# Patient Record
Sex: FEMALE | Race: WHITE | HISPANIC OR LATINO | Employment: FULL TIME | URBAN - METROPOLITAN AREA
[De-identification: names, ages, dates, MRNs, and addresses within clinical notes are randomized per-mention and may not be internally consistent; named-entity substitution may affect disease eponyms.]

---

## 2018-10-17 ENCOUNTER — INITIAL PRENATAL (OUTPATIENT)
Dept: OBSTETRICS AND GYNECOLOGY | Facility: CLINIC | Age: 23
End: 2018-10-17

## 2018-10-17 VITALS
HEIGHT: 63 IN | DIASTOLIC BLOOD PRESSURE: 60 MMHG | WEIGHT: 181 LBS | SYSTOLIC BLOOD PRESSURE: 87 MMHG | BODY MASS INDEX: 32.07 KG/M2

## 2018-10-17 DIAGNOSIS — Z34.80 SUPERVISION OF OTHER NORMAL PREGNANCY, ANTEPARTUM: Primary | ICD-10-CM

## 2018-10-17 PROBLEM — Z87.51 HISTORY OF PRETERM DELIVERY: Status: ACTIVE | Noted: 2018-10-17

## 2018-10-17 PROBLEM — O09.90 HIGH-RISK PREGNANCY: Status: ACTIVE | Noted: 2018-10-17

## 2018-10-17 LAB
B-HCG UR QL: POSITIVE
INTERNAL NEGATIVE CONTROL: NEGATIVE
INTERNAL POSITIVE CONTROL: POSITIVE
Lab: ABNORMAL

## 2018-10-17 PROCEDURE — 81025 URINE PREGNANCY TEST: CPT | Performed by: OBSTETRICS & GYNECOLOGY

## 2018-10-17 PROCEDURE — 0501F PRENATAL FLOW SHEET: CPT | Performed by: OBSTETRICS & GYNECOLOGY

## 2018-10-17 RX ORDER — NITROFURANTOIN 25; 75 MG/1; MG/1
CAPSULE ORAL
COMMUNITY
Start: 2018-10-15 | End: 2018-11-14

## 2018-10-17 RX ORDER — PRENATAL VIT NO.126/IRON/FOLIC 28MG-0.8MG
TABLET ORAL
COMMUNITY
Start: 2018-10-15

## 2018-10-17 NOTE — PATIENT INSTRUCTIONS
Travel During Pregnancy:  • Always use seatbelts.  A lap belt should be worn below the abdomen (across the hips) and the shoulder belt should be worn across the center of your chest (between the breasts) away from your neck.  Do not put the shoulder belt under your arm or behind your back.  Pull any slack out of the belt.  • Air travel is safe in most uncomplicated pregnancies, but we do not recommend air travel past 36 weeks.  Airlines may also have restrictions, so check with your airline before flying.  For some international flights, the travel cut off may be as early as 28 weeks gestation, and some airlines may require letters from your physician.  • When going on long trips in car, plane, train, or bus, frequent ambulation is important to prevent blood clots in legs and/or lungs.  The following may help with prevention of blood clots in legs: Drink lots of fluid, wear loose-fitting clothing, walk and stretch at regular intervals.    • Avoid areas with Zika outbreaks.  For the latest information, you may visit: www.cdc.gov/travel/notices/.  Resources: , , Committee Opinion 455  Nutrition during pregnancy  • Average weight gain during pregnancy is based on your pre-pregnancy body mass index (BMI).  See below for recommended weight gain:  o Underweight (BMI <18.5), we recommend 28 to 40lb weight gain  o Normal weight (BMI 18.5 to 24.9), we recommend a 25 to 35lb weight gain  o Overweight (BMI 25-29.9), we recommend a 15 to 25lb weight gain  o Obese (BMI >30), we recommend keeping weight gain under 20 lbs.    • You should speak with your physician regarding your specific weight goals for this pregnancy.   • Foods to avoid include:   o Fish: avoid certain types of fish such as shark, swordfish, meagan mackerel, tilefish.  Limit white (albacore) tuna to 6 oz per week.  Choose fish and shellfish such as shrimp, salmon, catfish, Linn.  o Food-borne illness: Pregnant women are much more likely to get  Listeriosis than non-pregnant women.  To help prevent this, avoid eating unpasteurized milk and unpasteurized milk products, hot dogs/lunch meat/cold cuts unless they are heated until steaming right before serving, refrigerated meat spreads, refrigerated smoked seafood, raw or undercooked seafood/eggs/meat.   • Vitamin D helps development of the baby’s bones and teeth.  Good sources of Vitamin D include milk fortified with Vitamin D and fatty fish such as salmon.    • Folic acid, also known as folate, helps develop the baby’s brain and spine.  You should make sure your vitamin contains extra folic acid - at least 400mcg.    • Iron helps make red blood cells.  You need to make extra red blood cell sin pregnancy.  We recommend eating Iron-rich foods such as lean red meat, poultry, fish, dried beans and peas, iron-fortified cereals, and prune juice.  You may be recommended an iron supplement.  If so, it is absorbed more easily if taken with vitamin C-rich foods such as citrus fruits or tomatoes.    • It is important to eat a well balanced diet.  A good recourse for nutrition recommendations is: www.choosemyplate.gov.  • Limit caffeine intake to 200mg daily.  Some coffees/teas/sodas have very different levels of caffeine per serving, so check the nutrition labeling.   Resources: , Committee Opinion 548  Exercise during pregnancy  • If you are healthy and your pregnancy is normal, it is safe to continue or start most types of exercise.   Physical activity does not increase your risk of miscarriage, low birth weight or early delivery, but you should discuss specific limitations or any complications with your physician.    • Benefits of exercise during pregnancy include: reduced back pain, less constipation, promotes overall health and healthy weight gain which may decrease risks for certain pregnancy complications such as diabetes and/or preeclampsia.  • The CDC recommends 150 minutes of moderate intensity aerobic  exercise per week.  Moderate intensity means that you are moving enough to raise your heart rate and start sweating, but you can talk normally.  Brisk walking, swimming/water workouts, modified yoga/pilates, and use of elliptical machines and/or stationary bikes are examples of aerobic activity.    • Precautions:  o Stay hydrated.  Drink plenty of water before, during and after your workout  o Wear supportive clothing such as a sports bra  o Do not become overheated.  Do not exercise outside when it is very hot or humid  o Avoid lying flat on your back as much as possible  o AVOID contact sports, skydiving, sports that risk falling (such as skiing, surfing, off-road cycling, gymnastics, horseback riding), hot yoga/hot pilates, scuba diving  • Stop exercising if you experience: bleeding from the vagina, feeling dizzy/faint, shortness of breath before starting exercise, chest pain, headache, muscle weakness, calf pain or swelling, regular uterine contractions, fluid leaking from the vagina.    • Postpartum exercise: Continuing exercise after you deliver your baby will help boost your energy, strengthen muscles, promote better sleep, and relieve stress.  It also may be useful in preventing postpartum depression.  150 minutes of moderate-intensity aerobic activity is recommended.  Types of exercise and when you can start a regular exercise routine may be limited by the type of delivery you had.  Please discuss with your physician prior to resuming or starting exercise.    Resources: ,   Back pain during pregnancy  • Back pain is very common during pregnancy.  It may arise due to strain on your back muscles, weakness of the abdominal muscles, and pregnancy hormones.    • To prevent back pain:  o Wear shoes with good support. Flat shoes and high heels may not have good arch support.  o Consider a firm mattress  o Use good lifting practices.  Do not bend from the waist to pick things up.  Squat down and bend  "your knees, keeping a straight back.  o Sit in chairs with good back support or use a small pillow behind your lower back.    o Sleep on your side with one or two pillows between your legs  • To ease back pain:   o Exercise can help stretch strained muscles and strengthen weak muscles to promote good posture  • Contact your health care provider with severe pain, pain that persists for more than 2 weeks, fever, burning during urination, or vaginal bleeding.  Resources:     Nausea/vomiting in pregnancy:  To help with nausea and vomiting you may try the following over the counter products:  Tenisha chews, tenisha tea, tenisha gum  Mint tea, peppermint gum or candy  B6/Doxylamine: to be taken daily, not just when symptoms occur  Pyridoxine (also known as B6): 10 to 25 mg orally every six to eight hours; the maximum treatment dose suggested for pregnant women is 200 mg/day.  Doxylamine (available in some over-the-counter sleeping pills (eg, Unisom Sleep Tabs) and as an antihistamine chewable tablet (Aldex AN)). One-half of the 25 mg over-the-counter tablet or two chewable 5 mg tablets can be used off-label as an antiemetic  · The Association of Profess[employer] of Gyne[employer] and Obstetric[employer] has released a smart phone application that can help you monitor and manage your symptoms.  The Application is \"Managing NVP,\" and has a green icon that says \"APGO WELLMOM.\"    If these do not work, we may need to try prescription medications.    Please contact us if you are unable to tolerate liquids (even sips of water) for over six to eight hours, have weight loss of over 10% of your body weight, blood in vomit, fever (temp of 100.4 or higher), or other concerning symptoms arise.      "

## 2018-10-17 NOTE — PROGRESS NOTES
"Initial OB Visit    Chief Complaint   Patient presents with   • Initial Prenatal Visit      Lauren Stahl is being seen today for her first obstetrical visit.  She is a 23 y.o.    6w5d gestation.  She was previously seen at [facility].  We do not have those records immediately available.  She reports she went there for vaginal bleeding and was told that she had \"a divided uterus\" and \"a small baby.\"  She is uncertain if they found a heartbeat.  She was treated for urinary tract infection with Macrobid which she has already taken for 4 days.  Reports the bleeding has improved, she does have some midline cramping type abdominal pain.  FOB is here with her today.   #: 1, Date: 08/01/15, Sex: Female, Weight: 3118 g (6 lb 14 oz), GA: 39w0d, Delivery: Vaginal, Spontaneous Delivery, Apgar1: None, Apgar5: None, Living: Living, Birth Comments: None    #: 2, Date: 09/15/16, Sex: Male, Weight: None, GA: 23w0d, Delivery: Vaginal, Spontaneous Delivery, Apgar1: None, Apgar5: None, Living:  Demise, Birth Comments: None    #: 3, Date: None, Sex: None, Weight: None, GA: None, Delivery: None, Apgar1: None, Apgar5: None, Living: None, Birth Comments: None      Current obstetric complaints: spotting improved, mild cramping in midline, mild nausea - no vomiting.     Prior obstetric issues, potential pregnancy concerns: Has history of 23 week delivery with  death around 2-3 hours of life.  This occurred in [place].  She reports symptoms of  labor with rupture of membranes, going to the hospital and delivering a previable baby.  Her pregnancy in , was uncomplicated and she had a term vaginal delivery  Family history of genetic issues (includes FOB): none known  Prior infections concerning in pregnancy (Rash, fever since LMP): No  Varicella Hx -Immune by history   Prior testing for Cystic Fibrosis Carrier or Sickle Cell Trait- unsure  History of abnormal pap smears:  denies  History of STIs: " "denies  Prepregnancy BMI - Body mass index is 32.06 kg/m².    History reviewed. No pertinent past medical history.    History reviewed. No pertinent surgical history.      Current Outpatient Prescriptions:   •  nitrofurantoin, macrocrystal-monohydrate, (MACROBID) 100 MG capsule, , Disp: , Rfl:   •  Prenatal Vit-Fe Fumarate-FA (PRENATAL, CLASSIC, VITAMIN) 28-0.8 MG tablet tablet, , Disp: , Rfl:     Allergies   Allergen Reactions   • Amoxicillin Itching       Social History     Social History   • Marital status: Unknown     Spouse name: N/A   • Number of children: N/A   • Years of education: N/A     Occupational History   • Not on file.     Social History Main Topics   • Smoking status: Never Smoker   • Smokeless tobacco: Never Used   • Alcohol use No   • Drug use: No   • Sexual activity: Yes     Partners: Male     Birth control/ protection: None     Other Topics Concern   • Not on file     Social History Narrative   • No narrative on file       Family History   Problem Relation Age of Onset   • Breast cancer Neg Hx    • Ovarian cancer Neg Hx    • Uterine cancer Neg Hx    • Colon cancer Neg Hx    • Pulmonary embolism Neg Hx    • Deep vein thrombosis Neg Hx        Review of systems     Constitutional: negative for chills, fevers and negative for fatigue  Eyes: negative  Ears, nose, mouth, throat, and face: negative for hearing loss and nasal congestion  Respiratory: negative for asthma and wheezing  Cardiovascular: negative for chest pain and dyspnea  Gastrointestinal: negative for dyspepsia, dysphagia abdominal pain  Genitourinary:negative for urinary incontinence  Integument/breast: negative for breast lump  Hematologic/lymphatic: negative for bleeding  Musculoskeletal:negative for aches  Neurological: negative for numbness/tingling  Behavioral/Psych: negative for anhedonia  Allergic/Immunologic: negative for rash, allergy         Objective    BP (!) 87/60   Ht 160 cm (63\")   Wt 82.1 kg (181 lb)   LMP 08/31/2018 " (Exact Date)   BMI 32.06 kg/m²       General Appearance:    Alert, cooperative, in no acute distress, habitus obese   Head:    Normocephalic, without obvious abnormality, atraumatic   Eyes:            Lids and lashes normal, conjunctivae and sclerae normal, no   icterus, no pallor, corneas clear   Ears:    Ears appear intact with no abnormalities noted       Neck:   No adenopathy, supple, trachea midline, no thyromegaly   Back:     No kyphosis present, no scoliosis present,                       Lungs:     Clear to auscultation,respirations regular, even and                   unlabored    Heart:    Regular rhythm and normal rate, normal S1 and S2, no            murmur, no gallop, no rub, no click   Breast Exam:    No masses, No nipple discharge   Abdomen:     Normal bowel sounds, no masses, no organomegaly, soft        non-tender, non-distended, no guarding, no rebound                 tenderness   Genitalia:    Vulva - BUS-WNL, NEFG    Vagina - dark brown discharge    Cervix - No Lesions, closed     Uterus - Consistent with early gestation    Adnexa - No masses, NT    Pelvimetry - clinically adequate, gynecoid pelvis     Extremities:   Moves all extremities well, no edema, no cyanosis, no              redness   Pulses:   Pulses palpable and equal bilaterally   Skin:   No bleeding, bruising or rash   Lymph nodes:   No palpable adenopathy   Neurologic:   Sensation intact, A&O times 3      Assessment  Pregnancy at 6w5d  H/o  delivery  Vaginal spotting  ?Mullerian anomaly     Plan    Initial labs drawn, GC/CHL screen done  Patient is on Prenatal vitamins  Problem list reviewed and updated.  Will request pap smear from PCP office.  Will request prior records from previous delivery  Reviewed routine prenatal care with the office to include but not limited to:   Zika (travel restrictions/ok to use insect repellant), not to changing cat litter, food restrictions, avoidance of alcohol, tobacco and drugs and  saunas/hot tubs, anticipated weight gain/nutrition requirements.  Reviewed nature of practice and hospital.  Reviewed recommended follow up, importance of compliance with care. We reviewed testing in pregnancy including HIV testing and urine drug screen.    Reviewed aneuploidy screening and CF/SMA screening - undecided.  Counseled on limitations of ultrasound in pregnancy.  All questions answered.     History of 23 week delivery:  Discussed with patient the recurrence risk of  labor and  delivery.  She has previously been recommended for 17 OHP and is agreeable to that this pregnancy.  We discussed completing the paperwork at next visit with the anticipation of starting the injections around 16 weeks and continuing through 36 weeks.  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  At this time patient does not have an acute abdomen.  We will get a beta hCG and follow-up once ultrasound records are back.  I recommended that in the meantime if she were to have heavy bleeding or worsening pain, she should seek medical attention.      Return in about 1 week (around 10/24/2018) for repeat US and OB visit.      Michelle Mancilla MD

## 2018-10-18 ENCOUNTER — TELEPHONE (OUTPATIENT)
Dept: OBSTETRICS AND GYNECOLOGY | Facility: CLINIC | Age: 23
End: 2018-10-18

## 2018-10-18 LAB
AMPHETAMINES UR QL SCN: NEGATIVE NG/ML
BARBITURATES UR QL SCN: NEGATIVE NG/ML
BENZODIAZ UR QL: NEGATIVE NG/ML
BZE UR QL: NEGATIVE NG/ML
CANNABINOIDS UR QL SCN: NEGATIVE NG/ML
HCG INTACT+B SERPL-ACNC: NORMAL MIU/ML
METHADONE UR QL SCN: NEGATIVE NG/ML
OPIATES UR QL: NEGATIVE NG/ML
PCP UR QL: NEGATIVE NG/ML
PROPOXYPH UR QL SCN: NEGATIVE NG/ML

## 2018-10-18 NOTE — TELEPHONE ENCOUNTER
Please call patient and see if she will be able to come in for an ultrasound and appointment tomorrow.  The ultrasound from ED showed a gestation with a yolk sac with no fetal pole which is unusual at how high her pregnancy hormone level is.  Thanks!

## 2018-10-18 NOTE — TELEPHONE ENCOUNTER
10/18/18  Patient informed and will come tomorrow for u/s at 2:40pm. [place] address was provided and to arrive 15min before appt. .

## 2018-10-19 ENCOUNTER — PROCEDURE VISIT (OUTPATIENT)
Dept: OBSTETRICS AND GYNECOLOGY | Facility: CLINIC | Age: 23
End: 2018-10-19

## 2018-10-19 ENCOUNTER — ROUTINE PRENATAL (OUTPATIENT)
Dept: OBSTETRICS AND GYNECOLOGY | Facility: CLINIC | Age: 23
End: 2018-10-19

## 2018-10-19 VITALS — BODY MASS INDEX: 32.24 KG/M2 | WEIGHT: 182 LBS | DIASTOLIC BLOOD PRESSURE: 71 MMHG | SYSTOLIC BLOOD PRESSURE: 107 MMHG

## 2018-10-19 DIAGNOSIS — O20.9 BLEEDING IN EARLY PREGNANCY: Primary | ICD-10-CM

## 2018-10-19 DIAGNOSIS — Q51.20 OTHER DOUBLING OF UTERUS, UNSPECIFIED: ICD-10-CM

## 2018-10-19 DIAGNOSIS — O36.80X0 PREGNANCY WITH UNCERTAIN FETAL VIABILITY, SINGLE OR UNSPECIFIED FETUS: ICD-10-CM

## 2018-10-19 DIAGNOSIS — Q51.3 BICORNUATE UTERUS AFFECTING PREGNANCY IN FIRST TRIMESTER, ANTEPARTUM: ICD-10-CM

## 2018-10-19 DIAGNOSIS — O09.91 HIGH-RISK PREGNANCY IN FIRST TRIMESTER: Primary | ICD-10-CM

## 2018-10-19 DIAGNOSIS — O34.01 BICORNUATE UTERUS AFFECTING PREGNANCY IN FIRST TRIMESTER, ANTEPARTUM: ICD-10-CM

## 2018-10-19 LAB
ABO GROUP BLD: (no result)
BACTERIA UR CULT: NO GROWTH
BACTERIA UR CULT: NORMAL
BASOPHILS # BLD AUTO: 0 X10E3/UL (ref 0–0.2)
BASOPHILS NFR BLD AUTO: 0 %
BLD GP AB SCN SERPL QL: NEGATIVE
C TRACH RRNA VAG QL NAA+PROBE: NEGATIVE
EOSINOPHIL # BLD AUTO: 0.1 X10E3/UL (ref 0–0.4)
EOSINOPHIL NFR BLD AUTO: 1 %
ERYTHROCYTE [DISTWIDTH] IN BLOOD BY AUTOMATED COUNT: 13.7 % (ref 12.3–15.4)
HBV SURFACE AG SERPL QL IA: NEGATIVE
HCT VFR BLD AUTO: 42.2 % (ref 34–46.6)
HCV AB S/CO SERPL IA: <0.1 S/CO RATIO (ref 0–0.9)
HGB BLD-MCNC: 14.3 G/DL (ref 11.1–15.9)
HIV 1+2 AB+HIV1 P24 AG SERPL QL IA: NON REACTIVE
IMM GRANULOCYTES # BLD: 0 X10E3/UL (ref 0–0.1)
IMM GRANULOCYTES NFR BLD: 0 %
LYMPHOCYTES # BLD AUTO: 2.6 X10E3/UL (ref 0.7–3.1)
LYMPHOCYTES NFR BLD AUTO: 20 %
MCH RBC QN AUTO: 30.5 PG (ref 26.6–33)
MCHC RBC AUTO-ENTMCNC: 33.9 G/DL (ref 31.5–35.7)
MCV RBC AUTO: 90 FL (ref 79–97)
MONOCYTES # BLD AUTO: 0.8 X10E3/UL (ref 0.1–0.9)
MONOCYTES NFR BLD AUTO: 6 %
N GONORRHOEA RRNA VAG QL NAA+PROBE: NEGATIVE
NEUTROPHILS # BLD AUTO: 9.6 X10E3/UL (ref 1.4–7)
NEUTROPHILS NFR BLD AUTO: 73 %
PLATELET # BLD AUTO: 242 X10E3/UL (ref 150–379)
RBC # BLD AUTO: 4.69 X10E6/UL (ref 3.77–5.28)
RH BLD: POSITIVE
RPR SER QL: NON REACTIVE
RUBV IGG SERPL IA-ACNC: 20 INDEX
T VAGINALIS RRNA VAG QL NAA+PROBE: NEGATIVE
WBC # BLD AUTO: 13.2 X10E3/UL (ref 3.4–10.8)

## 2018-10-19 PROCEDURE — 0502F SUBSEQUENT PRENATAL CARE: CPT | Performed by: OBSTETRICS & GYNECOLOGY

## 2018-10-19 PROCEDURE — 76817 TRANSVAGINAL US OBSTETRIC: CPT | Performed by: OBSTETRICS & GYNECOLOGY

## 2018-10-19 NOTE — PROGRESS NOTES
Chief Complaint   Patient presents with   • Routine Prenatal Visit      Lauren Stahl is a 23 y.o.  at 6w2d   Reports only having light spottin    /71   Wt 82.6 kg (182 lb)   LMP 2018 (Exact Date)   BMI 32.24 kg/m²    Abd: gravid, nontender  See OB Flowsheet    ASSESSMENT:   1. IUP at 6w2d   H/o  delivery at 23 weeks  Bicornuate uterus    PLAN:  Reviewed ultrasound with patient. Reviewed finding of bicornuate uterus.    Patient needs note for work.  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  Reviewed would recommend limiting weight lifting to <36lbs.  Expresses understanding. Okay to continue working.      Return in about 4 weeks (around 2018).  No orders of the defined types were placed in this encounter.

## 2018-11-14 ENCOUNTER — ROUTINE PRENATAL (OUTPATIENT)
Dept: OBSTETRICS AND GYNECOLOGY | Facility: CLINIC | Age: 23
End: 2018-11-14

## 2018-11-14 VITALS — SYSTOLIC BLOOD PRESSURE: 91 MMHG | DIASTOLIC BLOOD PRESSURE: 56 MMHG | BODY MASS INDEX: 31.71 KG/M2 | WEIGHT: 179 LBS

## 2018-11-14 DIAGNOSIS — Z34.80 SUPERVISION OF OTHER NORMAL PREGNANCY, ANTEPARTUM: Primary | ICD-10-CM

## 2018-11-14 PROCEDURE — 0502F SUBSEQUENT PRENATAL CARE: CPT | Performed by: OBSTETRICS & GYNECOLOGY

## 2018-11-14 NOTE — PROGRESS NOTES
Chief Complaint   Patient presents with   • Routine Prenatal Visit     patient reports pain on lower abdomen and legs      Lauren Stahl is a 23 y.o.  at 10w0d   Reports some pain in her thighs and lower pelvis that comes and goes  No vaginal bleeding  Daughter had X-ray on Monday and patient was out of room and is wondering if it causes problems    BP 91/56   Wt 81.2 kg (179 lb)   LMP 2018 (Exact Date)   BMI 31.71 kg/m²    Abd: gravid, nontender  See OB Flowsheet    ASSESSMENT:   1. IUP at 10w0d   H/o PTD  Bicornuate uterus    PLAN:  Reviewed CF/SMA/aneuploidy testing, patient declines  Reviewed safety of Xray and limited exposure in pregnancy.  No risk for daughter to have X-ray and okay for patient to have Xray if medically necessary  17OHP, patient desires. Paperwork in progress  Cervical length at 16 weeks  Immediately prior to leaving, pt informed MA that she thought she may have the flu because she is coughing.  Pt did not have a fever. We recommended she go to Urgent Care to get testing and if positive get tamiflu.      Return in about 4 weeks (around 2018) for OB visit, 6 weeks for US for cervical length.  No orders of the defined types were placed in this encounter.

## 2018-12-12 ENCOUNTER — ROUTINE PRENATAL (OUTPATIENT)
Dept: OBSTETRICS AND GYNECOLOGY | Facility: CLINIC | Age: 23
End: 2018-12-12

## 2018-12-12 VITALS — WEIGHT: 182 LBS | DIASTOLIC BLOOD PRESSURE: 61 MMHG | SYSTOLIC BLOOD PRESSURE: 107 MMHG | BODY MASS INDEX: 32.24 KG/M2

## 2018-12-12 DIAGNOSIS — Z34.80 SUPERVISION OF OTHER NORMAL PREGNANCY, ANTEPARTUM: Primary | ICD-10-CM

## 2018-12-12 PROCEDURE — 0502F SUBSEQUENT PRENATAL CARE: CPT | Performed by: OBSTETRICS & GYNECOLOGY

## 2018-12-12 NOTE — PROGRESS NOTES
Chief Complaint   Patient presents with   • Routine Prenatal Visit     patient reports lower abdominal uncomfortable mild pain.       Lauren Stahl is a 23 y.o.  at 14w0d   Declines interpretor  Reports that occasionally she has some mild lower abdominal pain. No vaginal bleeding.  Is interested in Jar[place] injections  /61   Wt 82.6 kg (182 lb)   LMP 2018 (Exact Date)   BMI 32.24 kg/m²    Abd: gravid, nontender  See OB Flowsheet    ASSESSMENT:   1. IUP at 14w0d   H/o PTD  Bicornuate uterus  Obesity  PLAN:  Recommend starting asa 81 mg for preeclampsia prevention  Cervical length next visit  Has completed 17OHP paperwork and desires to start therapy. Will contact company.      Return in about 4 weeks (around 2019).  No orders of the defined types were placed in this encounter.

## 2018-12-27 ENCOUNTER — PROCEDURE VISIT (OUTPATIENT)
Dept: OBSTETRICS AND GYNECOLOGY | Facility: CLINIC | Age: 23
End: 2018-12-27

## 2018-12-27 DIAGNOSIS — Z87.51 HISTORY OF PRETERM DELIVERY: Primary | ICD-10-CM

## 2018-12-27 PROCEDURE — 76817 TRANSVAGINAL US OBSTETRIC: CPT | Performed by: OBSTETRICS & GYNECOLOGY

## 2019-01-08 ENCOUNTER — ROUTINE PRENATAL (OUTPATIENT)
Dept: OBSTETRICS AND GYNECOLOGY | Facility: CLINIC | Age: 24
End: 2019-01-08

## 2019-01-08 VITALS — WEIGHT: 183 LBS | BODY MASS INDEX: 32.42 KG/M2 | DIASTOLIC BLOOD PRESSURE: 61 MMHG | SYSTOLIC BLOOD PRESSURE: 92 MMHG

## 2019-01-08 DIAGNOSIS — Z34.80 SUPERVISION OF OTHER NORMAL PREGNANCY, ANTEPARTUM: Primary | ICD-10-CM

## 2019-01-08 PROCEDURE — 99213 OFFICE O/P EST LOW 20 MIN: CPT | Performed by: OBSTETRICS & GYNECOLOGY

## 2019-01-08 NOTE — PROGRESS NOTES
Chief Complaint   Patient presents with   • Routine Prenatal Visit     patient reports lower abdominal pain      Lauren Stahl is a 23 y.o.  at 17w6d   Pt declines interpretor  Reports that she had some lower abdominal pain yesterday, resolved with rest. No vaginal bleeding or LOF  Has not yet been contacted by home health to start Delia  BP 92/61   Wt 83 kg (183 lb)   LMP 2018 (Exact Date)   BMI 32.42 kg/m²    Gen: NAD  Abd: gravid, nontender  See OB Flowsheet    ASSESSMENT:   1. IUP at 17w6d   H/o PTD  Bicornuate uterus  Obesity  PLAN:  Reviewed normal cervical length  Declines CF/SMA/Aneuploidy testing which was reviewed again today.  Will contact us if has not heard from 17OHP injections in 14 days  Abdominal pain resolved, but if recurs, recommend contacting office  Reviewed common second trimester symptoms.  Reviewed precautions for signs of  labor, anticipated fetal movements.      Return in about 3 weeks (around 2019) for anatomy US, 4 weeks OB US.  No orders of the defined types were placed in this encounter.

## 2019-01-30 ENCOUNTER — PROCEDURE VISIT (OUTPATIENT)
Dept: OBSTETRICS AND GYNECOLOGY | Facility: CLINIC | Age: 24
End: 2019-01-30

## 2019-01-30 DIAGNOSIS — Z36.89 ENCOUNTER FOR FETAL ANATOMIC SURVEY: ICD-10-CM

## 2019-01-30 DIAGNOSIS — Q51.3 BICORNUATE UTERUS AFFECTING PREGNANCY, ANTEPARTUM, SECOND TRIMESTER: ICD-10-CM

## 2019-01-30 DIAGNOSIS — Z87.51 HISTORY OF PRETERM DELIVERY: ICD-10-CM

## 2019-01-30 DIAGNOSIS — O34.02 BICORNUATE UTERUS AFFECTING PREGNANCY, ANTEPARTUM, SECOND TRIMESTER: ICD-10-CM

## 2019-01-30 PROCEDURE — 76805 OB US >/= 14 WKS SNGL FETUS: CPT | Performed by: OBSTETRICS & GYNECOLOGY

## 2019-01-30 PROCEDURE — 76817 TRANSVAGINAL US OBSTETRIC: CPT | Performed by: OBSTETRICS & GYNECOLOGY

## 2019-02-06 ENCOUNTER — ROUTINE PRENATAL (OUTPATIENT)
Dept: OBSTETRICS AND GYNECOLOGY | Facility: CLINIC | Age: 24
End: 2019-02-06

## 2019-02-06 VITALS — BODY MASS INDEX: 33.13 KG/M2 | SYSTOLIC BLOOD PRESSURE: 98 MMHG | DIASTOLIC BLOOD PRESSURE: 62 MMHG | WEIGHT: 187 LBS

## 2019-02-06 DIAGNOSIS — N89.8 VAGINAL ITCHING: Primary | ICD-10-CM

## 2019-02-06 PROCEDURE — 0502F SUBSEQUENT PRENATAL CARE: CPT | Performed by: OBSTETRICS & GYNECOLOGY

## 2019-02-06 RX ORDER — HYDROXYPROGESTERONE CAPROATE 250 MG/ML
INJECTION SUBCUTANEOUS
COMMUNITY
Start: 2019-01-16 | End: 2019-03-06 | Stop reason: SDUPTHER

## 2019-02-06 NOTE — PROGRESS NOTES
Chief Complaint   Patient presents with   • Routine Prenatal Visit     pt reports vaginal itching and yellowish and greenish discharge.       Lauren Stahl is a 24 y.o.  at 22w0d   Patient denies vaginal bleeding, leakage of fluid.  No contractions.  Notes normal fetal movements  Reports vaginal discharge and that her  went to the doctor and got treated for an infection with an antibiotic because he was having similar symptoms.   She does not know what kind of infection he had.    BP 98/62   Wt 84.8 kg (187 lb)   LMP 2018 (Exact Date)   BMI 33.13 kg/m²    Abd: gravid, nontender  Pelvic: normal external genitalia, normal vagina with white discharge, no bleeding  See OB Flowsheet    ASSESSMENT:   1. IUP at 22w0d   H/o PTD  Bicornuate uterus  Obesity  Vaginal discharge  PLAN:  Swab sent, will treat based on results  Normal anatomy US  Brought Delia auto injector and was instructed on how to administer.  She would like her  to administer at home each week.  Reviewed common second trimester symptoms.  Reviewed precautions for signs of  labor, anticipated fetal movements.    Contraception undecided  Return in about 4 weeks (around 3/6/2019) for GCT, OB visit.  Orders Placed This Encounter   Procedures   • NuSwab VG+ - Swab, Vagina

## 2019-02-10 LAB
A VAGINAE DNA VAG QL NAA+PROBE: NORMAL SCORE
BVAB2 DNA VAG QL NAA+PROBE: NORMAL SCORE
C ALBICANS DNA VAG QL NAA+PROBE: NEGATIVE
C GLABRATA DNA VAG QL NAA+PROBE: NEGATIVE
C TRACH RRNA SPEC QL NAA+PROBE: NEGATIVE
MEGA1 DNA VAG QL NAA+PROBE: NORMAL SCORE
N GONORRHOEA RRNA SPEC QL NAA+PROBE: NEGATIVE
T VAGINALIS RRNA SPEC QL NAA+PROBE: NEGATIVE

## 2019-02-12 ENCOUNTER — TELEPHONE (OUTPATIENT)
Dept: OBSTETRICS AND GYNECOLOGY | Facility: CLINIC | Age: 24
End: 2019-02-12

## 2019-02-12 NOTE — TELEPHONE ENCOUNTER
----- Message from Michelle Mancilla MD sent at 2/11/2019  7:09 AM EST -----  Please inform patient of negative gonorrhea/chlamydia/trichomonas/BV/yeast testing.  If she is having symptoms, we should consider further testing.    Patient informed of negative STD and BV/yeast

## 2019-02-13 ENCOUNTER — CLINICAL SUPPORT (OUTPATIENT)
Dept: OBSTETRICS AND GYNECOLOGY | Facility: CLINIC | Age: 24
End: 2019-02-13

## 2019-02-13 VITALS
DIASTOLIC BLOOD PRESSURE: 56 MMHG | BODY MASS INDEX: 33.49 KG/M2 | SYSTOLIC BLOOD PRESSURE: 91 MMHG | HEART RATE: 90 BPM | WEIGHT: 189 LBS | HEIGHT: 63 IN

## 2019-02-13 DIAGNOSIS — O09.219 PRIOR PRETERM LABOR, ANTEPARTUM: Primary | ICD-10-CM

## 2019-02-13 PROCEDURE — 96372 THER/PROPH/DIAG INJ SC/IM: CPT | Performed by: OBSTETRICS & GYNECOLOGY

## 2019-02-13 NOTE — PROGRESS NOTES
(Patient supplied)  Patient here for WILLY injection. Patient did not have a reaction to the injection or medication. Injection on the left arm

## 2019-02-20 ENCOUNTER — CLINICAL SUPPORT (OUTPATIENT)
Dept: OBSTETRICS AND GYNECOLOGY | Facility: CLINIC | Age: 24
End: 2019-02-20

## 2019-02-20 VITALS
SYSTOLIC BLOOD PRESSURE: 101 MMHG | WEIGHT: 187 LBS | DIASTOLIC BLOOD PRESSURE: 61 MMHG | HEART RATE: 79 BPM | BODY MASS INDEX: 33.13 KG/M2 | HEIGHT: 63 IN

## 2019-02-20 DIAGNOSIS — Z87.51 HISTORY OF PRETERM DELIVERY: ICD-10-CM

## 2019-02-20 PROCEDURE — 96372 THER/PROPH/DIAG INJ SC/IM: CPT | Performed by: OBSTETRICS & GYNECOLOGY

## 2019-02-20 NOTE — PROGRESS NOTES
(Patient supplied)  Patient here for eagle injection. Patient did not have a reaction to the injection or medication. Injection on the right deltoid.  LOT: [id number]  EXP: 04/2021

## 2019-02-27 ENCOUNTER — CLINICAL SUPPORT (OUTPATIENT)
Dept: OBSTETRICS AND GYNECOLOGY | Facility: CLINIC | Age: 24
End: 2019-02-27

## 2019-02-27 VITALS
DIASTOLIC BLOOD PRESSURE: 54 MMHG | WEIGHT: 187 LBS | SYSTOLIC BLOOD PRESSURE: 93 MMHG | BODY MASS INDEX: 33.13 KG/M2 | HEIGHT: 63 IN | HEART RATE: 75 BPM

## 2019-02-27 DIAGNOSIS — Z87.51 HISTORY OF PRETERM DELIVERY: ICD-10-CM

## 2019-02-27 PROCEDURE — 96372 THER/PROPH/DIAG INJ SC/IM: CPT | Performed by: OBSTETRICS & GYNECOLOGY

## 2019-02-27 NOTE — PROGRESS NOTES
(Patient supplied)  Patient here for eagle injection. Patient did not have a reaction to the injection or medication. Injection on the leftleft arm subcutaneous  LOT: [id number]  EXP: apr 2021

## 2019-03-06 ENCOUNTER — ROUTINE PRENATAL (OUTPATIENT)
Dept: OBSTETRICS AND GYNECOLOGY | Facility: CLINIC | Age: 24
End: 2019-03-06

## 2019-03-06 VITALS — SYSTOLIC BLOOD PRESSURE: 95 MMHG | WEIGHT: 188 LBS | DIASTOLIC BLOOD PRESSURE: 51 MMHG | BODY MASS INDEX: 33.31 KG/M2

## 2019-03-06 DIAGNOSIS — Z34.80 SUPERVISION OF OTHER NORMAL PREGNANCY, ANTEPARTUM: Primary | ICD-10-CM

## 2019-03-06 PROCEDURE — 0502F SUBSEQUENT PRENATAL CARE: CPT | Performed by: OBSTETRICS & GYNECOLOGY

## 2019-03-06 PROCEDURE — 96372 THER/PROPH/DIAG INJ SC/IM: CPT | Performed by: OBSTETRICS & GYNECOLOGY

## 2019-03-06 NOTE — PROGRESS NOTES
Chief Complaint   Patient presents with   • Routine Prenatal Visit    Declines interpretor  Lauren Stahl is a 24 y.o.  at 26w0d   Denies any problems today.  Denies vaginal bleeding or leakage of fluid, denies contractions.  Notes normal fetal movements.  She is still on her [place]    BP 95/51   Wt 85.3 kg (188 lb)   LMP 2018 (Exact Date)   BMI 33.31 kg/m²    Abd: gravid, nontender  See OB Flowsheet    ASSESSMENT:   1. IUP at 26w0d   2. Bicornuate uterus  3. H/o  delivery, on eagle  PLAN:  Continue Mcgill  Glucose challenge and repeat CBC at next visit   labor precautions reviewed    Return in about 2 weeks (around 3/20/2019) for Glucose challenge and CBC.  No orders of the defined types were placed in this encounter.

## 2019-03-13 ENCOUNTER — CLINICAL SUPPORT (OUTPATIENT)
Dept: OBSTETRICS AND GYNECOLOGY | Facility: CLINIC | Age: 24
End: 2019-03-13

## 2019-03-13 VITALS
WEIGHT: 189 LBS | SYSTOLIC BLOOD PRESSURE: 98 MMHG | BODY MASS INDEX: 34.78 KG/M2 | DIASTOLIC BLOOD PRESSURE: 64 MMHG | HEIGHT: 62 IN | HEART RATE: 76 BPM

## 2019-03-13 DIAGNOSIS — O09.892 HISTORY OF PRETERM DELIVERY, CURRENTLY PREGNANT IN SECOND TRIMESTER: Primary | ICD-10-CM

## 2019-03-13 PROCEDURE — 96372 THER/PROPH/DIAG INJ SC/IM: CPT | Performed by: OBSTETRICS & GYNECOLOGY

## 2019-03-20 ENCOUNTER — ROUTINE PRENATAL (OUTPATIENT)
Dept: OBSTETRICS AND GYNECOLOGY | Facility: CLINIC | Age: 24
End: 2019-03-20

## 2019-03-20 VITALS — WEIGHT: 192 LBS | SYSTOLIC BLOOD PRESSURE: 99 MMHG | BODY MASS INDEX: 35.12 KG/M2 | DIASTOLIC BLOOD PRESSURE: 65 MMHG

## 2019-03-20 DIAGNOSIS — Z34.80 SUPERVISION OF OTHER NORMAL PREGNANCY, ANTEPARTUM: Primary | ICD-10-CM

## 2019-03-20 PROCEDURE — 0502F SUBSEQUENT PRENATAL CARE: CPT | Performed by: OBSTETRICS & GYNECOLOGY

## 2019-03-21 LAB — GLUCOSE 1H P 50 G GLC PO SERPL-MCNC: 92 MG/DL (ref 65–139)

## 2019-03-22 ENCOUNTER — TELEPHONE (OUTPATIENT)
Dept: OBSTETRICS AND GYNECOLOGY | Facility: CLINIC | Age: 24
End: 2019-03-22

## 2019-03-22 NOTE — TELEPHONE ENCOUNTER
----- Message from Michelle Mancilla MD sent at 3/21/2019  5:21 PM EDT -----  Please let patient know that her one hour glucose testing (gestational diabetes testing) was normal.  Thanks!

## 2019-03-27 ENCOUNTER — CLINICAL SUPPORT (OUTPATIENT)
Dept: OBSTETRICS AND GYNECOLOGY | Facility: CLINIC | Age: 24
End: 2019-03-27

## 2019-03-27 VITALS
BODY MASS INDEX: 35.51 KG/M2 | HEIGHT: 62 IN | SYSTOLIC BLOOD PRESSURE: 98 MMHG | DIASTOLIC BLOOD PRESSURE: 61 MMHG | HEART RATE: 88 BPM | WEIGHT: 193 LBS

## 2019-03-27 DIAGNOSIS — Z87.51 HISTORY OF PRETERM DELIVERY: Primary | ICD-10-CM

## 2019-03-27 PROCEDURE — 96372 THER/PROPH/DIAG INJ SC/IM: CPT | Performed by: OBSTETRICS & GYNECOLOGY

## 2019-03-27 RX ORDER — HYDROXYPROGESTERONE CAPROATE 250 MG/ML
250 INJECTION INTRAMUSCULAR
Status: DISCONTINUED | OUTPATIENT
Start: 2019-03-27 | End: 2019-06-04

## 2019-03-27 RX ADMIN — HYDROXYPROGESTERONE CAPROATE 250 MG: 250 INJECTION INTRAMUSCULAR at 10:14

## 2019-03-27 NOTE — PROGRESS NOTES
(Patient supplied)  Patient here for depo injection. Patient did not have a reaction to the injection or medication. Injection on the left subcutaneous  LOT: [id number]  EXP: apr 2021

## 2019-04-03 ENCOUNTER — ROUTINE PRENATAL (OUTPATIENT)
Dept: OBSTETRICS AND GYNECOLOGY | Facility: CLINIC | Age: 24
End: 2019-04-03

## 2019-04-03 VITALS — WEIGHT: 193 LBS | SYSTOLIC BLOOD PRESSURE: 94 MMHG | DIASTOLIC BLOOD PRESSURE: 57 MMHG | BODY MASS INDEX: 35.29 KG/M2

## 2019-04-03 DIAGNOSIS — Z34.80 SUPERVISION OF OTHER NORMAL PREGNANCY, ANTEPARTUM: Primary | ICD-10-CM

## 2019-04-03 PROCEDURE — 90471 IMMUNIZATION ADMIN: CPT | Performed by: OBSTETRICS & GYNECOLOGY

## 2019-04-03 PROCEDURE — 90715 TDAP VACCINE 7 YRS/> IM: CPT | Performed by: OBSTETRICS & GYNECOLOGY

## 2019-04-03 PROCEDURE — 0502F SUBSEQUENT PRENATAL CARE: CPT | Performed by: OBSTETRICS & GYNECOLOGY

## 2019-04-03 NOTE — PROGRESS NOTES
Chief Complaint   Patient presents with   • Routine Prenatal Visit      Lauren Stahl is a 24 y.o.  at 30w0d   Her insurance changed and she was unable to receive her last Mcgill.  This is since been corrected.  Denies any vaginal bleeding or leakage of fluid, denies any contractions.  Notes normal fetal movements    BP 94/57   Wt 87.5 kg (193 lb)   LMP 2018 (Exact Date)   BMI 35.29 kg/m²    Abd: gravid, nontender    See OB Flowsheet    ASSESSMENT:   1. IUP at 30w0d   2.  History of  labor, on Mcgill  PLAN:  Plan to continue Mcgill shot.  Her insurance is been corrected with the company.   labor precautions reviewed.  Encouraged daily fetal kick counts.  Agrees to Tdap.  Repeat CBC today  Return in about 2 weeks (around 2019) for Growth US and OB visit.  Orders Placed This Encounter   Procedures   • Tdap Vaccine Greater Than or Equal To 6yo IM   • CBC (No Diff)     Order Specific Question:   [facility] Has the patient fasted?     Answer:   No

## 2019-04-04 LAB
ERYTHROCYTE [DISTWIDTH] IN BLOOD BY AUTOMATED COUNT: 13.5 % (ref 12.3–15.4)
HCT VFR BLD AUTO: 36.8 % (ref 34–46.6)
HGB BLD-MCNC: 12.6 G/DL (ref 11.1–15.9)
MCH RBC QN AUTO: 30.3 PG (ref 26.6–33)
MCHC RBC AUTO-ENTMCNC: 34.2 G/DL (ref 31.5–35.7)
MCV RBC AUTO: 89 FL (ref 79–97)
PLATELET # BLD AUTO: 226 X10E3/UL (ref 150–379)
RBC # BLD AUTO: 4.16 X10E6/UL (ref 3.77–5.28)
WBC # BLD AUTO: 10.4 X10E3/UL (ref 3.4–10.8)

## 2019-04-04 NOTE — PROGRESS NOTES
Please call patient and let her know that her CBC was normal.  If she has any questions, I am happy to answer them. Thanks!

## 2019-04-05 ENCOUNTER — TELEPHONE (OUTPATIENT)
Dept: OBSTETRICS AND GYNECOLOGY | Facility: CLINIC | Age: 24
End: 2019-04-05

## 2019-04-05 NOTE — TELEPHONE ENCOUNTER
----- Message from Michelle Mancilla MD sent at 4/4/2019  8:19 AM EDT -----  Please call patient and let her know that her CBC was normal.  If she has any questions, I am happy to answer them. Thanks!    04/05/19  Pt informed of normal CBC

## 2019-04-07 ENCOUNTER — HOSPITAL ENCOUNTER (OUTPATIENT)
Facility: HOSPITAL | Age: 24
Setting detail: OBSERVATION
Discharge: HOME | End: 2019-04-07
Attending: OBSTETRICS & GYNECOLOGY | Admitting: OBSTETRICS & GYNECOLOGY

## 2019-04-07 VITALS
BODY MASS INDEX: 34.19 KG/M2 | RESPIRATION RATE: 16 BRPM | HEART RATE: 81 BPM | DIASTOLIC BLOOD PRESSURE: 74 MMHG | HEIGHT: 63 IN | SYSTOLIC BLOOD PRESSURE: 117 MMHG | TEMPERATURE: 98.9 F

## 2019-04-07 PROBLEM — Z34.90 PREGNANCY: Status: ACTIVE | Noted: 2019-04-07

## 2019-04-07 PROCEDURE — 99214 OFFICE O/P EST MOD 30 MIN: CPT | Performed by: OBSTETRICS & GYNECOLOGY

## 2019-04-07 PROCEDURE — 59025 FETAL NON-STRESS TEST: CPT | Performed by: OBSTETRICS & GYNECOLOGY

## 2019-04-07 PROCEDURE — 59025 FETAL NON-STRESS TEST: CPT

## 2019-04-07 PROCEDURE — G0378 HOSPITAL OBSERVATION PER HR: HCPCS

## 2019-04-08 NOTE — DISCHARGE SUMMARY
Date of Discharge:  2019    Discharge Diagnosis: Intrauterine pregnancy at 30-4/7 weeks with occasional contractions    Presenting Problem/History of Present Illness  Pregnancy [Z34.90]       Hospital Course  Patient is a 24 y.o. female presented for evaluation of uterine contractions.  The patient reports that she has a contraction proximate once every 30 minutes.  It lasts less than 1 minute.  She denies any vaginal bleeding.  Reports active fetal movement.  This has been going for the last 1-2 days.  It has neither worsened, nor has it improved.      Prenatal course is significant for history of a 23-week delivery with an earlier pregnancy.  The patient also has a term pregnancy.  She has been treated with Mcintyre during this pregnancy.      Patient was evaluated on labor and delivery.  Fetal heart tones were reactive.  There were no contractions visible on toco.  There were no contractions palpable.  On my examination, the cervix was long, closed and posterior.  I counseled the patient through a [place]  regarding my findings and recommendations.  I feel that the patient is not in  labor.  She is stable for discharge home.  I recommend monitoring contractions.  If she has a contraction lasting 1 minute or longer, occurring every 15 minutes or more, she should return to the hospital.  Otherwise, she should follow-up in the office this week.  I answered the patient's questions and she agrees with these recommendations..      Procedures Performed         Consults:   Consults     No orders found from 3/9/2019 to 2019.              Condition on Discharge: Stable    Vital Signs  Temp:  [98.9 °F (37.2 °C)] 98.9 °F (37.2 °C)  Heart Rate:  [81] 81  Resp:  [16] 16  BP: (117)/(74) 117/74        Discharge Disposition      Discharge Medications     Discharge Medications      ASK your doctor about these medications      Instructions Start Date   aspirin 81 MG tablet   81 mg, Oral,  Daily      prenatal (CLASSIC) vitamin 28-0.8 MG tablet tablet   No dose, route, or frequency recorded.             Discharge Diet:     Activity at Discharge:     Follow-up Appointments  Future Appointments   Date Time Provider Department Center   4/10/2019  1:00 PM NURSE LOBGYN PRES [facility] None   4/16/2019  2:20 PM ULTRASOUND [facility]N PRES [facility] None   4/16/2019  3:00 PM Michelle Mancilla MD [facility] None     [unfilled]    Test Results Pending at Discharge  [unfilled]     Alex Nickerson MD  04/07/19  10:33 PM

## 2019-04-08 NOTE — NON STRESS TEST
Lauren Stahl, a  at 30w4d with an SILVANA of 2019, by Ultrasound, was seen at [facility] for a nonstress test.    Chief Complaint   Patient presents with   • Contractions     starting at 1500. Every 20 to 30 minutes. Vaginal pressure. Denies vaginal bleeding and loss of fluid. +FM       Patient Active Problem List   Diagnosis   • History of  delivery   • High-risk pregnancy   • Bicornuate uterus   • Pregnancy       Start Time:   Stop Time:      Interpretation A  Nonstress Test Interpretation A: Reactive (19 : Nate Harris RN)

## 2019-04-16 ENCOUNTER — ROUTINE PRENATAL (OUTPATIENT)
Dept: OBSTETRICS AND GYNECOLOGY | Facility: CLINIC | Age: 24
End: 2019-04-16

## 2019-04-16 ENCOUNTER — PROCEDURE VISIT (OUTPATIENT)
Dept: OBSTETRICS AND GYNECOLOGY | Facility: CLINIC | Age: 24
End: 2019-04-16

## 2019-04-16 VITALS — BODY MASS INDEX: 34.01 KG/M2 | DIASTOLIC BLOOD PRESSURE: 56 MMHG | WEIGHT: 192 LBS | SYSTOLIC BLOOD PRESSURE: 91 MMHG

## 2019-04-16 DIAGNOSIS — Z34.80 SUPERVISION OF OTHER NORMAL PREGNANCY, ANTEPARTUM: Primary | ICD-10-CM

## 2019-04-16 DIAGNOSIS — O09.899 HISTORY OF PRETERM DELIVERY, CURRENTLY PREGNANT: ICD-10-CM

## 2019-04-16 DIAGNOSIS — Z36.89 ENCOUNTER FOR ULTRASOUND TO CHECK FETAL GROWTH: Primary | ICD-10-CM

## 2019-04-16 PROCEDURE — 0502F SUBSEQUENT PRENATAL CARE: CPT | Performed by: OBSTETRICS & GYNECOLOGY

## 2019-04-16 PROCEDURE — 76816 OB US FOLLOW-UP PER FETUS: CPT | Performed by: OBSTETRICS & GYNECOLOGY

## 2019-04-16 NOTE — PROGRESS NOTES
Chief Complaint   Patient presents with   • Routine Prenatal Visit     pt states she no longer wants to continue [place] because her arms hurt too much. She also states her hands fall asleep.       Lauren Stahl is a 24 y.o.  at 31w6d   Declines interpretor  Reports no contractions since leaving the hospital.   Denies vaginal bleeding or leakage of fluid.  Notes normal fetal movements.  She is considered stopping the injections because she feels that her pregnancy will not deliver .    BP 91/56   Wt 87.1 kg (192 lb)   LMP 2018 (Exact Date)   BMI 34.01 kg/m²    Abd: gravid, nontender    See OB Flowsheet    ASSESSMENT:   1. IUP at 31w6d   2. Bicornuate uterus  3. H/o  delivery, on eagle    PLAN:  Patient and I reviewed her ultrasound.  I discussed with the patient that  labor is likely multifactorial.  I would recommend continuing her 17 OHP.  She then informed us that she is changed her insurance so we will check with her insurance to see if she can continue receiving Mcintyre.  She will agreed to continuing 17 OHP if it is coverered   labor precautions reviewed including return for evaluation if she has contractions lasting a minute and happening 15 minutes or more frequently.  Patient expressed understanding.  Reviewed common symptoms of the third trimester.  Counseled on labor precautions and anticipated fetal movements.  Reviewed kick counts.  Patient is aware of the location of L&D.    No Follow-up on file.  No orders of the defined types were placed in this encounter.

## 2019-04-23 ENCOUNTER — PROCEDURE VISIT (OUTPATIENT)
Dept: OBSTETRICS AND GYNECOLOGY | Facility: CLINIC | Age: 24
End: 2019-04-23

## 2019-04-23 ENCOUNTER — ROUTINE PRENATAL (OUTPATIENT)
Dept: OBSTETRICS AND GYNECOLOGY | Facility: CLINIC | Age: 24
End: 2019-04-23

## 2019-04-23 ENCOUNTER — TELEPHONE (OUTPATIENT)
Dept: OBSTETRICS AND GYNECOLOGY | Facility: CLINIC | Age: 24
End: 2019-04-23

## 2019-04-23 VITALS — DIASTOLIC BLOOD PRESSURE: 58 MMHG | BODY MASS INDEX: 34.54 KG/M2 | WEIGHT: 195 LBS | SYSTOLIC BLOOD PRESSURE: 90 MMHG

## 2019-04-23 DIAGNOSIS — O09.93 HIGH-RISK PREGNANCY IN THIRD TRIMESTER: ICD-10-CM

## 2019-04-23 DIAGNOSIS — O36.80X0 ENCOUNTER TO DETERMINE FETAL VIABILITY OF PREGNANCY, SINGLE OR UNSPECIFIED FETUS: Primary | ICD-10-CM

## 2019-04-23 PROCEDURE — 0502F SUBSEQUENT PRENATAL CARE: CPT | Performed by: OBSTETRICS & GYNECOLOGY

## 2019-04-23 PROCEDURE — 76815 OB US LIMITED FETUS(S): CPT | Performed by: OBSTETRICS & GYNECOLOGY

## 2019-04-23 PROCEDURE — 59025 FETAL NON-STRESS TEST: CPT | Performed by: OBSTETRICS & GYNECOLOGY

## 2019-04-23 NOTE — TELEPHONE ENCOUNTER
PT called states she took ibuprofen due to knee pain. PT is 32wks. States she was not aware that she should not take it during pregnancy. PT is concerned and wants to be seen today. PT # [phone]

## 2019-04-23 NOTE — TELEPHONE ENCOUNTER
One dose of ibuprofen is unlikely to cause any harm, although would recommend not taking any more NSAIDs and only using tylenol as needed.  If pt requests may come in at the end of the day for doppler heart tones, but please let her know there may be a wait as she is an overbook. Thanks!

## 2019-04-23 NOTE — TELEPHONE ENCOUNTER
Pt came to the [place] location worried. I advised pt message in the chart from Dr Mancilla. She then stated via  that she fell at work yesterday and is also having a headache with the knee pain. Pt is scheduled for a US and ob fu with Dr Mancilla while is at the office.    Pt is aware

## 2019-04-23 NOTE — PROGRESS NOTES
Chief Complaint   Patient presents with   • Fall     pt reports she she feel at  yesterday, pt reports she got a headache and knee pain after the fall, due to headache she took ibuprofen. Pt reports no abdomen pain until today.       Lauren Stahl is a 24 y.o.  at 32w6d   Patient reports a mechanical fall yesterday during which she had her knees.  She denies any abdominal trauma.  She reports normal fetal movements, no contractions, no vaginal bleeding since that time.  She took a one-time dose of ibuprofen, 2 pills for knee pain  BP 90/58   Wt 88.5 kg (195 lb)   LMP 2018 (Exact Date)   BMI 34.54 kg/m²    Abd: gravid, nontender  See OB Flowsheet    ASSESSMENT:   1. IUP at 32w6d   2. Mechanical fall  3. Bicornuate uterus  4. H/o  delivery, re-authorizing [place]  PLAN:  Counseled patient that when a fall occurs, would recommend contacting our office or immediate evaluation labor and delivery.  Patient on nonstress test,reactive. No contractions on tocometer  Ultrasound performed with normal fetal heart tones and normal ENRIQUE.  Reviewed with patient that nonsteroidal anti-inflammatory drugs are not recommended in pregnancy.  A one-time dose is unlikely to cause harm but would not recommend any further dosing.  Use only Tylenol, less than 4 g over 24 hours, as needed for pain patient expressed agreement and understanding.  Patient requesting 4 weeks off prior to delivery as this is part of her job benefits.  I discussed that we do not provide disability unless an indication arises but if her job uses this is routine maternity leave, we can addend her [facility] papers for this  Follow up as scheduled  No orders of the defined types were placed in this encounter.    NST: 19 [id number]  NST Interpretation:    FHT with normal baseline, moderate variability, + accelerations, no decelerations  [place]: quiet    Reactive NST

## 2019-05-01 ENCOUNTER — ROUTINE PRENATAL (OUTPATIENT)
Dept: OBSTETRICS AND GYNECOLOGY | Facility: CLINIC | Age: 24
End: 2019-05-01

## 2019-05-01 VITALS — WEIGHT: 195 LBS | DIASTOLIC BLOOD PRESSURE: 62 MMHG | BODY MASS INDEX: 34.54 KG/M2 | SYSTOLIC BLOOD PRESSURE: 102 MMHG

## 2019-05-01 DIAGNOSIS — Z34.80 SUPERVISION OF OTHER NORMAL PREGNANCY, ANTEPARTUM: Primary | ICD-10-CM

## 2019-05-01 PROCEDURE — 0502F SUBSEQUENT PRENATAL CARE: CPT | Performed by: OBSTETRICS & GYNECOLOGY

## 2019-05-01 NOTE — PROGRESS NOTES
Chief Complaint   Patient presents with   • Routine Prenatal Visit     patient reports leg pain and lower abdomen.       Lauren Stahl is a 24 y.o.  at 34w0d   Patient reports aches in her lower back that radiates down the back and side of her legs.  Denies any vaginal bleeding or leakage of fluid.  Has felt an occasional contraction but nothing regular.  Notes normal fetal movements.  She reports that the pain in her back and legs is worse at the end of the day or when she is very active.  It gets better when she lays down.    /62   Wt 88.5 kg (195 lb)   LMP 2018 (Exact Date)   BMI 34.54 kg/m²    Abd: gravid, nontender  See OB Flowsheet    ASSESSMENT:   1. IUP at 34w0d   2. Back pain in pregnancy  3. Bicornuate uterus  4. H/o  delivery, declines [place] now  PLAN:  We reviewed patient's back and leg pain.  She is nontender on exam.  Likely MSK pain which can be normal in pregnancy. Reviewed symptom relief with stretching, use of heating pad on low, warm bath/shower.  Reviewed common symptoms of the third trimester.  Counseled on labor precautions and anticipated fetal movements.  Reviewed kick counts.  Patient is aware of the location of L&D.     Patient has been counseled multiple times on the risks and benefits of [place].  She declines [place] at this time.  She has been having insurance coverage issues and does not wish to pursue the injections further.  Will plan growth US for uterine anomaly     Return in about 2 weeks (around 5/15/2019) for growth US and OB visit.  No orders of the defined types were placed in this encounter.

## 2019-05-15 ENCOUNTER — ROUTINE PRENATAL (OUTPATIENT)
Dept: OBSTETRICS AND GYNECOLOGY | Facility: CLINIC | Age: 24
End: 2019-05-15

## 2019-05-15 ENCOUNTER — PROCEDURE VISIT (OUTPATIENT)
Dept: OBSTETRICS AND GYNECOLOGY | Facility: CLINIC | Age: 24
End: 2019-05-15

## 2019-05-15 VITALS — BODY MASS INDEX: 35.07 KG/M2 | SYSTOLIC BLOOD PRESSURE: 100 MMHG | WEIGHT: 198 LBS | DIASTOLIC BLOOD PRESSURE: 66 MMHG

## 2019-05-15 DIAGNOSIS — O34.03 BICORNATE UTERUS COMPLICATING PREGNANCY, THIRD TRIMESTER: ICD-10-CM

## 2019-05-15 DIAGNOSIS — Q51.3 BICORNATE UTERUS COMPLICATING PREGNANCY, THIRD TRIMESTER: ICD-10-CM

## 2019-05-15 DIAGNOSIS — Z36.89 ENCOUNTER FOR ULTRASOUND TO CHECK FETAL GROWTH: Primary | ICD-10-CM

## 2019-05-15 DIAGNOSIS — Z34.80 SUPERVISION OF OTHER NORMAL PREGNANCY, ANTEPARTUM: Primary | ICD-10-CM

## 2019-05-15 PROCEDURE — 76816 OB US FOLLOW-UP PER FETUS: CPT | Performed by: OBSTETRICS & GYNECOLOGY

## 2019-05-15 PROCEDURE — 0502F SUBSEQUENT PRENATAL CARE: CPT | Performed by: OBSTETRICS & GYNECOLOGY

## 2019-05-15 NOTE — PROGRESS NOTES
Chief Complaint   Patient presents with   • Routine Prenatal Visit      Lauren Stahl is a 24 y.o.  at 36w0d   Denies contractions, vaginal bleeding, leakage of fluid.  Notes normal fetal movements    /66   Wt 89.8 kg (198 lb)   LMP 2018 (Exact Date)   BMI 35.07 kg/m²    Abd: gravid, nontender  See OB Flowsheet    ASSESSMENT:   1. IUP at 36w0d   2. H/o  delivery  3. Bicornuate uterus  PLAN:  Reviewed normal growth ultrasound, 5lb 13 oz - vertex, ENRIQUE wnl  Reviewed common symptoms of the third trimester.  Counseled on labor precautions and anticipated fetal movements.  Reviewed kick counts.  Patient is aware of the location of L&D.     GBS today  Return in about 1 week (around 2019).  Orders Placed This Encounter   Procedures   • Strep Grp B VIOLET + Reflex - Swab, Vaginal/Rectum

## 2019-05-19 LAB
CLINDAMYCIN ISLT KB: ABNORMAL
GP B STREP DNA SPEC QL NAA+PROBE: POSITIVE
ORGANISM ID: ABNORMAL

## 2019-05-21 ENCOUNTER — ROUTINE PRENATAL (OUTPATIENT)
Dept: OBSTETRICS AND GYNECOLOGY | Facility: CLINIC | Age: 24
End: 2019-05-21

## 2019-05-21 ENCOUNTER — TELEPHONE (OUTPATIENT)
Dept: OBSTETRICS AND GYNECOLOGY | Facility: CLINIC | Age: 24
End: 2019-05-21

## 2019-05-21 VITALS — WEIGHT: 199 LBS | BODY MASS INDEX: 35.25 KG/M2 | SYSTOLIC BLOOD PRESSURE: 96 MMHG | DIASTOLIC BLOOD PRESSURE: 64 MMHG

## 2019-05-21 DIAGNOSIS — Z34.80 SUPERVISION OF OTHER NORMAL PREGNANCY, ANTEPARTUM: Primary | ICD-10-CM

## 2019-05-21 PROCEDURE — 0502F SUBSEQUENT PRENATAL CARE: CPT | Performed by: OBSTETRICS & GYNECOLOGY

## 2019-05-21 NOTE — PROGRESS NOTES
Chief Complaint   Patient presents with   • Routine Prenatal Visit     patient reports swollen feet/hands and headaches.        Lauren Stahl is a 24 y.o.  at 36w6d   Patient reports daily headache but this is like her headache she gets during and before pregnancy.  She has taken Tylenol with some relief.  She denies any vision changes or right upper quadrant pain.  She has had some swollen hands of the last week and has had to take offerings.    BP 96/64   Wt 90.3 kg (199 lb)   LMP 2018 (Exact Date)   BMI 35.25 kg/m²    Abd: gravid, nontender  See OB Flowsheet    ASSESSMENT:   1. IUP at 36w6d   2. H/o  delivery  3. Bicornuate uterus  PLAN:  Reviewed with patient safe medications to treat headache in pregnancy.  I do not suspect preeclampsia as her blood pressures are normal.  We reviewed that swelling can also be normal but if she were to have asymmetric swelling especially of the lower extremities, would recommend evaluation his blood clot can arise in pregnancy.  We reviewed  labor precautions.  Follow-up weekly  Return in about 1 week (around 2019).  No orders of the defined types were placed in this encounter.

## 2019-05-21 NOTE — TELEPHONE ENCOUNTER
----- Message from Michelle Mancilla MD sent at 5/20/2019 10:20 AM EDT -----  Please let patient know her GBS is positive and clindamycin resistant meaning likely she will need vancomycin.  Thanks!    -pt was informed of positive GBS and clindamycin resistant. Pt is aware that will try vancomycin

## 2019-05-29 ENCOUNTER — ROUTINE PRENATAL (OUTPATIENT)
Dept: OBSTETRICS AND GYNECOLOGY | Facility: CLINIC | Age: 24
End: 2019-05-29

## 2019-05-29 VITALS — SYSTOLIC BLOOD PRESSURE: 103 MMHG | DIASTOLIC BLOOD PRESSURE: 70 MMHG | WEIGHT: 201 LBS | BODY MASS INDEX: 35.61 KG/M2

## 2019-05-29 DIAGNOSIS — Z34.80 SUPERVISION OF OTHER NORMAL PREGNANCY, ANTEPARTUM: Primary | ICD-10-CM

## 2019-05-29 PROCEDURE — 0502F SUBSEQUENT PRENATAL CARE: CPT | Performed by: OBSTETRICS & GYNECOLOGY

## 2019-05-29 RX ORDER — SODIUM CHLORIDE 0.9 % (FLUSH) 0.9 %
1-10 SYRINGE (ML) INJECTION AS NEEDED
Status: CANCELLED | OUTPATIENT
Start: 2019-05-29

## 2019-05-29 RX ORDER — SODIUM CHLORIDE 0.9 % (FLUSH) 0.9 %
3 SYRINGE (ML) INJECTION EVERY 12 HOURS SCHEDULED
Status: CANCELLED | OUTPATIENT
Start: 2019-05-29

## 2019-05-29 RX ORDER — OXYTOCIN 10 [USP'U]/ML
999 INJECTION, SOLUTION INTRAMUSCULAR; INTRAVENOUS ONCE
Status: CANCELLED | OUTPATIENT
Start: 2019-05-29

## 2019-05-29 RX ORDER — OXYTOCIN 10 [USP'U]/ML
125 INJECTION, SOLUTION INTRAMUSCULAR; INTRAVENOUS CONTINUOUS PRN
Status: CANCELLED | OUTPATIENT
Start: 2019-05-29

## 2019-05-29 RX ORDER — OXYTOCIN 10 [USP'U]/ML
2-30 INJECTION, SOLUTION INTRAMUSCULAR; INTRAVENOUS
Status: CANCELLED | OUTPATIENT
Start: 2019-05-29

## 2019-05-29 RX ORDER — OXYTOCIN 10 [USP'U]/ML
250 INJECTION, SOLUTION INTRAMUSCULAR; INTRAVENOUS CONTINUOUS
Status: CANCELLED | OUTPATIENT
Start: 2019-05-29 | End: 2019-05-29

## 2019-05-29 RX ORDER — CARBOPROST TROMETHAMINE 250 UG/ML
250 INJECTION, SOLUTION INTRAMUSCULAR AS NEEDED
Status: CANCELLED | OUTPATIENT
Start: 2019-05-29

## 2019-05-29 RX ORDER — METHYLERGONOVINE MALEATE 0.2 MG/ML
200 INJECTION INTRAVENOUS ONCE AS NEEDED
Status: CANCELLED | OUTPATIENT
Start: 2019-05-29

## 2019-05-29 RX ORDER — LIDOCAINE HYDROCHLORIDE 10 MG/ML
5 INJECTION, SOLUTION EPIDURAL; INFILTRATION; INTRACAUDAL; PERINEURAL AS NEEDED
Status: CANCELLED | OUTPATIENT
Start: 2019-05-29

## 2019-05-29 RX ORDER — MISOPROSTOL 100 UG/1
800 TABLET ORAL AS NEEDED
Status: CANCELLED | OUTPATIENT
Start: 2019-05-29

## 2019-05-29 NOTE — PROGRESS NOTES
Chief Complaint   Patient presents with   • Routine Prenatal Visit     pt reports lower abdominal pain which she states she thinks it may be that the baby is pushing down.       Lauren Stahl is a 24 y.o.  at 38w0d   Reports some contractions that come and go - denies vb/lof  Notes normal fetal movements.  Requesting induction of labor if goes past 39 weeks    /70   Wt 91.2 kg (201 lb)   LMP 2018 (Exact Date)   BMI 35.61 kg/m²    Abd: gravid, nontender  See OB Flowsheet    ASSESSMENT:   1. IUP at 38w0d   2.  GBS positive  3.  Bicornuate uterus  PLAN:  Reviewed with patient risks and benefits of induction of labor versus expectant management in pregnancy.  Patient would like induction of labor.  She is GBS positive with a low risk allergy to penicillin.  Her clindamycin susceptibility is resistant.  Would recommend trying cephalosporin as penicillin allergy is low risk.  Favorable cervical exam, will start with Pitocin.  Will schedule induction of labor for 39 weeks.  Reviewed common symptoms of the third trimester.  Counseled on labor precautions and anticipated fetal movements.  Reviewed kick counts.  Patient is aware of the location of L&D.       No orders of the defined types were placed in this encounter.

## 2019-06-03 ENCOUNTER — ANESTHESIA EVENT (OUTPATIENT)
Dept: LABOR AND DELIVERY | Facility: HOSPITAL | Age: 24
End: 2019-06-03

## 2019-06-03 ENCOUNTER — ANESTHESIA (OUTPATIENT)
Dept: LABOR AND DELIVERY | Facility: HOSPITAL | Age: 24
End: 2019-06-03

## 2019-06-03 ENCOUNTER — HOSPITAL ENCOUNTER (INPATIENT)
Facility: HOSPITAL | Age: 24
LOS: 3 days | Discharge: HOME | End: 2019-06-06
Attending: OBSTETRICS & GYNECOLOGY | Admitting: OBSTETRICS & GYNECOLOGY

## 2019-06-03 DIAGNOSIS — Z34.80 SUPERVISION OF OTHER NORMAL PREGNANCY, ANTEPARTUM: ICD-10-CM

## 2019-06-03 PROBLEM — Z34.90 PREGNANT: Status: ACTIVE | Noted: 2019-06-03

## 2019-06-03 PROCEDURE — 85025 COMPLETE CBC W/AUTO DIFF WBC: CPT | Performed by: OBSTETRICS & GYNECOLOGY

## 2019-06-03 PROCEDURE — 86850 RBC ANTIBODY SCREEN: CPT | Performed by: OBSTETRICS & GYNECOLOGY

## 2019-06-03 PROCEDURE — 86900 BLOOD TYPING SEROLOGIC ABO: CPT | Performed by: OBSTETRICS & GYNECOLOGY

## 2019-06-03 PROCEDURE — 86901 BLOOD TYPING SEROLOGIC RH(D): CPT | Performed by: OBSTETRICS & GYNECOLOGY

## 2019-06-03 RX ORDER — ACETAMINOPHEN 325 MG/1
650 TABLET ORAL EVERY 4 HOURS PRN
Status: DISCONTINUED | OUTPATIENT
Start: 2019-06-03 | End: 2019-06-04

## 2019-06-03 RX ORDER — ONDANSETRON 2 MG/ML
4 INJECTION INTRAMUSCULAR; INTRAVENOUS EVERY 6 HOURS PRN
Status: DISCONTINUED | OUTPATIENT
Start: 2019-06-03 | End: 2019-06-04

## 2019-06-03 RX ORDER — ONDANSETRON 4 MG/1
4 TABLET, FILM COATED ORAL EVERY 6 HOURS PRN
Status: DISCONTINUED | OUTPATIENT
Start: 2019-06-03 | End: 2019-06-04

## 2019-06-03 RX ORDER — SODIUM CHLORIDE 0.9 % (FLUSH) 0.9 %
3 SYRINGE (ML) INJECTION EVERY 12 HOURS SCHEDULED
Status: DISCONTINUED | OUTPATIENT
Start: 2019-06-04 | End: 2019-06-04

## 2019-06-03 RX ORDER — SODIUM CHLORIDE 0.9 % (FLUSH) 0.9 %
1-10 SYRINGE (ML) INJECTION AS NEEDED
Status: DISCONTINUED | OUTPATIENT
Start: 2019-06-03 | End: 2019-06-04

## 2019-06-03 RX ORDER — EPHEDRINE SULFATE 50 MG/ML
5 INJECTION, SOLUTION INTRAVENOUS AS NEEDED
Status: DISCONTINUED | OUTPATIENT
Start: 2019-06-03 | End: 2019-06-04

## 2019-06-03 RX ORDER — FAMOTIDINE 20 MG/1
20 TABLET, FILM COATED ORAL EVERY 12 HOURS PRN
Status: DISCONTINUED | OUTPATIENT
Start: 2019-06-03 | End: 2019-06-04

## 2019-06-03 RX ORDER — DIPHENHYDRAMINE HYDROCHLORIDE 50 MG/ML
12.5 INJECTION INTRAMUSCULAR; INTRAVENOUS EVERY 8 HOURS PRN
Status: DISCONTINUED | OUTPATIENT
Start: 2019-06-03 | End: 2019-06-04

## 2019-06-03 RX ORDER — TERBUTALINE SULFATE 1 MG/ML
0.25 INJECTION, SOLUTION SUBCUTANEOUS AS NEEDED
Status: DISCONTINUED | OUTPATIENT
Start: 2019-06-03 | End: 2019-06-04

## 2019-06-03 RX ORDER — ONDANSETRON 2 MG/ML
4 INJECTION INTRAMUSCULAR; INTRAVENOUS ONCE AS NEEDED
Status: DISCONTINUED | OUTPATIENT
Start: 2019-06-03 | End: 2019-06-04

## 2019-06-03 RX ORDER — VANCOMYCIN HYDROCHLORIDE 1 G/200ML
1 INJECTION, SOLUTION INTRAVENOUS EVERY 12 HOURS
Status: DISCONTINUED | OUTPATIENT
Start: 2019-06-04 | End: 2019-06-04

## 2019-06-03 RX ORDER — LIDOCAINE HYDROCHLORIDE 10 MG/ML
5 INJECTION, SOLUTION EPIDURAL; INFILTRATION; INTRACAUDAL; PERINEURAL AS NEEDED
Status: DISCONTINUED | OUTPATIENT
Start: 2019-06-03 | End: 2019-06-04

## 2019-06-03 RX ORDER — SODIUM CHLORIDE, SODIUM LACTATE, POTASSIUM CHLORIDE, CALCIUM CHLORIDE 600; 310; 30; 20 MG/100ML; MG/100ML; MG/100ML; MG/100ML
125 INJECTION, SOLUTION INTRAVENOUS CONTINUOUS
Status: DISCONTINUED | OUTPATIENT
Start: 2019-06-04 | End: 2019-06-04

## 2019-06-03 RX ORDER — FAMOTIDINE 10 MG/ML
20 INJECTION, SOLUTION INTRAVENOUS ONCE AS NEEDED
Status: DISCONTINUED | OUTPATIENT
Start: 2019-06-03 | End: 2019-06-04

## 2019-06-03 RX ORDER — FAMOTIDINE 10 MG/ML
20 INJECTION, SOLUTION INTRAVENOUS EVERY 12 HOURS PRN
Status: DISCONTINUED | OUTPATIENT
Start: 2019-06-03 | End: 2019-06-04

## 2019-06-03 RX ADMIN — SODIUM CHLORIDE, POTASSIUM CHLORIDE, SODIUM LACTATE AND CALCIUM CHLORIDE 1000 ML: 600; 310; 30; 20 INJECTION, SOLUTION INTRAVENOUS at 23:44

## 2019-06-04 PROBLEM — Z34.90 PREGNANT: Status: RESOLVED | Noted: 2019-06-04 | Resolved: 2019-06-04

## 2019-06-04 PROBLEM — Z34.90 TERM PREGNANCY: Status: ACTIVE | Noted: 2019-06-04

## 2019-06-04 PROBLEM — Z34.90 PREGNANT: Status: ACTIVE | Noted: 2019-06-04

## 2019-06-04 PROBLEM — Z37.9 NORMAL LABOR: Status: ACTIVE | Noted: 2019-06-04

## 2019-06-04 PROBLEM — Z34.90 PREGNANT: Status: RESOLVED | Noted: 2019-06-03 | Resolved: 2019-06-04

## 2019-06-04 LAB
ABO GROUP BLD: NORMAL
BASOPHILS # BLD AUTO: 0.05 10*3/MM3 (ref 0–0.2)
BASOPHILS NFR BLD AUTO: 0.4 % (ref 0–1.5)
BLD GP AB SCN SERPL QL: NEGATIVE
DEPRECATED RDW RBC AUTO: 44.9 FL (ref 37–54)
EOSINOPHIL # BLD AUTO: 0.06 10*3/MM3 (ref 0–0.4)
EOSINOPHIL NFR BLD AUTO: 0.5 % (ref 0.3–6.2)
ERYTHROCYTE [DISTWIDTH] IN BLOOD BY AUTOMATED COUNT: 13.7 % (ref 12.3–15.4)
HCT VFR BLD AUTO: 41.8 % (ref 34–46.6)
HGB BLD-MCNC: 14 G/DL (ref 12–15.9)
IMM GRANULOCYTES # BLD AUTO: 0.12 10*3/MM3 (ref 0–0.05)
IMM GRANULOCYTES NFR BLD AUTO: 1 % (ref 0–0.5)
LYMPHOCYTES # BLD AUTO: 2.58 10*3/MM3 (ref 0.7–3.1)
LYMPHOCYTES NFR BLD AUTO: 22.1 % (ref 19.6–45.3)
MCH RBC QN AUTO: 30.2 PG (ref 26.6–33)
MCHC RBC AUTO-ENTMCNC: 33.5 G/DL (ref 31.5–35.7)
MCV RBC AUTO: 90.3 FL (ref 79–97)
MONOCYTES # BLD AUTO: 0.79 10*3/MM3 (ref 0.1–0.9)
MONOCYTES NFR BLD AUTO: 6.8 % (ref 5–12)
NEUTROPHILS # BLD AUTO: 8.08 10*3/MM3 (ref 1.7–7)
NEUTROPHILS NFR BLD AUTO: 69.2 % (ref 42.7–76)
NRBC BLD AUTO-RTO: 0 /100 WBC (ref 0–0.2)
PLATELET # BLD AUTO: 237 10*3/MM3 (ref 140–450)
PMV BLD AUTO: 11 FL (ref 6–12)
RBC # BLD AUTO: 4.63 10*6/MM3 (ref 3.77–5.28)
RH BLD: POSITIVE
T&S EXPIRATION DATE: NORMAL
WBC NRBC COR # BLD: 11.68 10*3/MM3 (ref 3.4–10.8)

## 2019-06-04 PROCEDURE — 25010000002 METHYLERGONOVINE MALEATE PER 0.2 MG: Performed by: OBSTETRICS & GYNECOLOGY

## 2019-06-04 PROCEDURE — C1755 CATHETER, INTRASPINAL: HCPCS | Performed by: ANESTHESIOLOGY

## 2019-06-04 PROCEDURE — 59400 OBSTETRICAL CARE: CPT | Performed by: OBSTETRICS & GYNECOLOGY

## 2019-06-04 PROCEDURE — C1755 CATHETER, INTRASPINAL: HCPCS

## 2019-06-04 PROCEDURE — 0UQMXZZ REPAIR VULVA, EXTERNAL APPROACH: ICD-10-PCS | Performed by: OBSTETRICS & GYNECOLOGY

## 2019-06-04 PROCEDURE — 25010000002 VANCOMYCIN PER 500 MG: Performed by: OBSTETRICS & GYNECOLOGY

## 2019-06-04 RX ORDER — HYDROCODONE BITARTRATE AND ACETAMINOPHEN 5; 325 MG/1; MG/1
1 TABLET ORAL EVERY 4 HOURS PRN
Status: DISCONTINUED | OUTPATIENT
Start: 2019-06-04 | End: 2019-06-06 | Stop reason: HOSPADM

## 2019-06-04 RX ORDER — CARBOPROST TROMETHAMINE 250 UG/ML
250 INJECTION, SOLUTION INTRAMUSCULAR AS NEEDED
Status: DISCONTINUED | OUTPATIENT
Start: 2019-06-04 | End: 2019-06-04 | Stop reason: HOSPADM

## 2019-06-04 RX ORDER — OXYTOCIN-SODIUM CHLORIDE 0.9% IV SOLN 30 UNIT/500ML 30-0.9/5 UT/ML-%
250 SOLUTION INTRAVENOUS CONTINUOUS
Status: DISPENSED | OUTPATIENT
Start: 2019-06-04 | End: 2019-06-04

## 2019-06-04 RX ORDER — OXYTOCIN-SODIUM CHLORIDE 0.9% IV SOLN 30 UNIT/500ML 30-0.9/5 UT/ML-%
999 SOLUTION INTRAVENOUS ONCE
Status: DISCONTINUED | OUTPATIENT
Start: 2019-06-04 | End: 2019-06-06 | Stop reason: HOSPADM

## 2019-06-04 RX ORDER — OXYTOCIN-SODIUM CHLORIDE 0.9% IV SOLN 30 UNIT/500ML 30-0.9/5 UT/ML-%
125 SOLUTION INTRAVENOUS CONTINUOUS PRN
Status: DISCONTINUED | OUTPATIENT
Start: 2019-06-04 | End: 2019-06-06 | Stop reason: HOSPADM

## 2019-06-04 RX ORDER — LIDOCAINE HYDROCHLORIDE AND EPINEPHRINE 15; 5 MG/ML; UG/ML
INJECTION, SOLUTION EPIDURAL AS NEEDED
Status: DISCONTINUED | OUTPATIENT
Start: 2019-06-04 | End: 2019-06-04 | Stop reason: SURG

## 2019-06-04 RX ORDER — PRENATAL VIT NO.126/IRON/FOLIC 28MG-0.8MG
1 TABLET ORAL DAILY
Status: DISCONTINUED | OUTPATIENT
Start: 2019-06-04 | End: 2019-06-06 | Stop reason: HOSPADM

## 2019-06-04 RX ORDER — SODIUM CHLORIDE 0.9 % (FLUSH) 0.9 %
1-10 SYRINGE (ML) INJECTION AS NEEDED
Status: DISCONTINUED | OUTPATIENT
Start: 2019-06-04 | End: 2019-06-06 | Stop reason: HOSPADM

## 2019-06-04 RX ORDER — LIDOCAINE HYDROCHLORIDE 20 MG/ML
INJECTION, SOLUTION EPIDURAL; INFILTRATION; INTRACAUDAL; PERINEURAL AS NEEDED
Status: DISCONTINUED | OUTPATIENT
Start: 2019-06-04 | End: 2019-06-04 | Stop reason: SURG

## 2019-06-04 RX ORDER — BISACODYL 10 MG
10 SUPPOSITORY, RECTAL RECTAL DAILY PRN
Status: DISCONTINUED | OUTPATIENT
Start: 2019-06-05 | End: 2019-06-06 | Stop reason: HOSPADM

## 2019-06-04 RX ORDER — OXYTOCIN-SODIUM CHLORIDE 0.9% IV SOLN 30 UNIT/500ML 30-0.9/5 UT/ML-%
2-30 SOLUTION INTRAVENOUS
Status: DISCONTINUED | OUTPATIENT
Start: 2019-06-04 | End: 2019-06-04 | Stop reason: HOSPADM

## 2019-06-04 RX ORDER — METHYLERGONOVINE MALEATE 0.2 MG/ML
200 INJECTION INTRAVENOUS ONCE AS NEEDED
Status: DISCONTINUED | OUTPATIENT
Start: 2019-06-04 | End: 2019-06-04 | Stop reason: HOSPADM

## 2019-06-04 RX ORDER — OXYTOCIN-SODIUM CHLORIDE 0.9% IV SOLN 30 UNIT/500ML 30-0.9/5 UT/ML-%
999 SOLUTION INTRAVENOUS ONCE
Status: COMPLETED | OUTPATIENT
Start: 2019-06-04 | End: 2019-06-04

## 2019-06-04 RX ORDER — OXYTOCIN-SODIUM CHLORIDE 0.9% IV SOLN 30 UNIT/500ML 30-0.9/5 UT/ML-%
125 SOLUTION INTRAVENOUS CONTINUOUS PRN
Status: COMPLETED | OUTPATIENT
Start: 2019-06-04 | End: 2019-06-04

## 2019-06-04 RX ORDER — SODIUM CHLORIDE 0.9 % (FLUSH) 0.9 %
3 SYRINGE (ML) INJECTION EVERY 12 HOURS SCHEDULED
Status: DISCONTINUED | OUTPATIENT
Start: 2019-06-04 | End: 2019-06-04 | Stop reason: HOSPADM

## 2019-06-04 RX ORDER — DOCUSATE SODIUM 100 MG/1
100 CAPSULE, LIQUID FILLED ORAL 2 TIMES DAILY
Status: DISCONTINUED | OUTPATIENT
Start: 2019-06-04 | End: 2019-06-06 | Stop reason: HOSPADM

## 2019-06-04 RX ORDER — METHYLERGONOVINE MALEATE 0.2 MG/ML
200 INJECTION INTRAVENOUS ONCE AS NEEDED
Status: COMPLETED | OUTPATIENT
Start: 2019-06-04 | End: 2019-06-04

## 2019-06-04 RX ORDER — LIDOCAINE HYDROCHLORIDE 10 MG/ML
5 INJECTION, SOLUTION EPIDURAL; INFILTRATION; INTRACAUDAL; PERINEURAL AS NEEDED
Status: DISCONTINUED | OUTPATIENT
Start: 2019-06-04 | End: 2019-06-04 | Stop reason: HOSPADM

## 2019-06-04 RX ORDER — SODIUM CHLORIDE 0.9 % (FLUSH) 0.9 %
1-10 SYRINGE (ML) INJECTION AS NEEDED
Status: DISCONTINUED | OUTPATIENT
Start: 2019-06-04 | End: 2019-06-04 | Stop reason: HOSPADM

## 2019-06-04 RX ORDER — ONDANSETRON 4 MG/1
4 TABLET, FILM COATED ORAL EVERY 6 HOURS PRN
Status: DISCONTINUED | OUTPATIENT
Start: 2019-06-04 | End: 2019-06-04 | Stop reason: HOSPADM

## 2019-06-04 RX ORDER — MISOPROSTOL 200 UG/1
800 TABLET ORAL AS NEEDED
Status: DISCONTINUED | OUTPATIENT
Start: 2019-06-04 | End: 2019-06-04

## 2019-06-04 RX ORDER — CARBOPROST TROMETHAMINE 250 UG/ML
250 INJECTION, SOLUTION INTRAMUSCULAR ONCE AS NEEDED
Status: DISCONTINUED | OUTPATIENT
Start: 2019-06-04 | End: 2019-06-06 | Stop reason: HOSPADM

## 2019-06-04 RX ORDER — LOPERAMIDE HYDROCHLORIDE 2 MG/1
2 CAPSULE ORAL 4 TIMES DAILY PRN
Status: DISCONTINUED | OUTPATIENT
Start: 2019-06-04 | End: 2019-06-06 | Stop reason: HOSPADM

## 2019-06-04 RX ORDER — CALCIUM CARBONATE 200(500)MG
2 TABLET,CHEWABLE ORAL 3 TIMES DAILY PRN
Status: DISCONTINUED | OUTPATIENT
Start: 2019-06-04 | End: 2019-06-06 | Stop reason: HOSPADM

## 2019-06-04 RX ORDER — MISOPROSTOL 200 UG/1
600 TABLET ORAL ONCE AS NEEDED
Status: DISCONTINUED | OUTPATIENT
Start: 2019-06-04 | End: 2019-06-06 | Stop reason: HOSPADM

## 2019-06-04 RX ORDER — CEFAZOLIN SODIUM 2 G/100ML
2 INJECTION, SOLUTION INTRAVENOUS ONCE
Status: DISCONTINUED | OUTPATIENT
Start: 2019-06-04 | End: 2019-06-04

## 2019-06-04 RX ORDER — IBUPROFEN 800 MG/1
800 TABLET ORAL EVERY 8 HOURS SCHEDULED
Status: DISCONTINUED | OUTPATIENT
Start: 2019-06-04 | End: 2019-06-06 | Stop reason: HOSPADM

## 2019-06-04 RX ORDER — LANOLIN
CREAM (ML) TOPICAL
Status: DISCONTINUED | OUTPATIENT
Start: 2019-06-04 | End: 2019-06-06 | Stop reason: HOSPADM

## 2019-06-04 RX ORDER — CARBOPROST TROMETHAMINE 250 UG/ML
250 INJECTION, SOLUTION INTRAMUSCULAR AS NEEDED
Status: DISCONTINUED | OUTPATIENT
Start: 2019-06-04 | End: 2019-06-04

## 2019-06-04 RX ORDER — ACETAMINOPHEN 325 MG/1
650 TABLET ORAL EVERY 4 HOURS PRN
Status: DISCONTINUED | OUTPATIENT
Start: 2019-06-04 | End: 2019-06-04 | Stop reason: HOSPADM

## 2019-06-04 RX ORDER — OXYTOCIN-SODIUM CHLORIDE 0.9% IV SOLN 30 UNIT/500ML 30-0.9/5 UT/ML-%
250 SOLUTION INTRAVENOUS CONTINUOUS
Status: ACTIVE | OUTPATIENT
Start: 2019-06-04 | End: 2019-06-04

## 2019-06-04 RX ORDER — HYDROCODONE BITARTRATE AND ACETAMINOPHEN 5; 325 MG/1; MG/1
2 TABLET ORAL EVERY 4 HOURS PRN
Status: DISCONTINUED | OUTPATIENT
Start: 2019-06-04 | End: 2019-06-04 | Stop reason: HOSPADM

## 2019-06-04 RX ORDER — ONDANSETRON 2 MG/ML
4 INJECTION INTRAMUSCULAR; INTRAVENOUS EVERY 6 HOURS PRN
Status: DISCONTINUED | OUTPATIENT
Start: 2019-06-04 | End: 2019-06-04 | Stop reason: HOSPADM

## 2019-06-04 RX ORDER — ONDANSETRON 2 MG/ML
4 INJECTION INTRAMUSCULAR; INTRAVENOUS EVERY 6 HOURS PRN
Status: DISCONTINUED | OUTPATIENT
Start: 2019-06-04 | End: 2019-06-06 | Stop reason: HOSPADM

## 2019-06-04 RX ORDER — MISOPROSTOL 200 UG/1
800 TABLET ORAL AS NEEDED
Status: DISCONTINUED | OUTPATIENT
Start: 2019-06-04 | End: 2019-06-04 | Stop reason: HOSPADM

## 2019-06-04 RX ADMIN — HYDROCODONE BITARTATE AND ACETAMINOPHEN 1 TABLET: 5; 325 TABLET ORAL at 06:38

## 2019-06-04 RX ADMIN — Medication: at 16:07

## 2019-06-04 RX ADMIN — HYDROCODONE BITARTATE AND ACETAMINOPHEN 1 TABLET: 5; 325 TABLET ORAL at 16:07

## 2019-06-04 RX ADMIN — LIDOCAINE HYDROCHLORIDE 4 ML: 20 INJECTION, SOLUTION EPIDURAL; INFILTRATION; INTRACAUDAL; PERINEURAL at 00:20

## 2019-06-04 RX ADMIN — OXYTOCIN 250 ML/HR: 10 INJECTION INTRAVENOUS at 04:34

## 2019-06-04 RX ADMIN — METHYLERGONOVINE MALEATE 200 MCG: 0.2 INJECTION, SOLUTION INTRAMUSCULAR; INTRAVENOUS at 04:33

## 2019-06-04 RX ADMIN — SODIUM CHLORIDE, POTASSIUM CHLORIDE, SODIUM LACTATE AND CALCIUM CHLORIDE 125 ML/HR: 600; 310; 30; 20 INJECTION, SOLUTION INTRAVENOUS at 00:41

## 2019-06-04 RX ADMIN — VANCOMYCIN HYDROCHLORIDE 1 G: 1 INJECTION, SOLUTION INTRAVENOUS at 00:11

## 2019-06-04 RX ADMIN — LIDOCAINE HYDROCHLORIDE 3 ML: 20 INJECTION, SOLUTION EPIDURAL; INFILTRATION; INTRACAUDAL; PERINEURAL at 00:28

## 2019-06-04 RX ADMIN — OXYTOCIN 125 ML/HR: 10 INJECTION INTRAVENOUS at 05:39

## 2019-06-04 RX ADMIN — IBUPROFEN 800 MG: 800 TABLET, FILM COATED ORAL at 16:07

## 2019-06-04 RX ADMIN — LIDOCAINE HYDROCHLORIDE AND EPINEPHRINE 3 ML: 15; 5 INJECTION, SOLUTION EPIDURAL at 00:18

## 2019-06-04 RX ADMIN — MISOPROSTOL 600 MCG: 200 TABLET ORAL at 04:28

## 2019-06-04 RX ADMIN — LIDOCAINE HYDROCHLORIDE 3 ML: 20 INJECTION, SOLUTION EPIDURAL; INFILTRATION; INTRACAUDAL; PERINEURAL at 00:24

## 2019-06-04 RX ADMIN — IBUPROFEN 800 MG: 800 TABLET, FILM COATED ORAL at 06:38

## 2019-06-04 RX ADMIN — OXYTOCIN 999 ML/HR: 10 INJECTION INTRAVENOUS at 04:19

## 2019-06-04 RX ADMIN — Medication 10 ML/HR: at 00:29

## 2019-06-04 NOTE — PLAN OF CARE
Problem: Patient Care Overview  Goal: Plan of Care Review  Outcome: Ongoing (interventions implemented as appropriate)   06/04/19 0552   Coping/Psychosocial   Plan of Care Reviewed With patient;spouse   Plan of Care Review   Progress improving   OTHER   Outcome Summary pt is s/p vaginal delivery. pt denies pain. Epidural was removed with tip intact. pt has successfully  infant male. pt was treated with vancomycin for GBS + during labor. pt has no other c/o or needs at this time. Pt prepared for transport to mother baby.     Goal: Individualization and Mutuality   06/04/19 0033 06/04/19 0552   Individualization   Patient Specific Preferences --  breastfeeding, ENEDINA care, epidural   Patient Specific Goals (Include Timeframe) --  vaginal delivery, breastfeeding, pain control   Patient Specific Interventions --  epidural, frequent position changes, lactation consult   Mutuality/Individual Preferences   How to Address Anxieties/Fears n/a --    Mutuality/Individual Preferences   What Anxieties, Fears, Concerns, or Questions Do You Have About Your Care? none --    What Information Would Help Us Give You More Personalized Care? no --    How Would You and/or Your Support Person Like to Participate in Your Care? be present --      Goal: Discharge Needs Assessment  Outcome: Ongoing (interventions implemented as appropriate)   06/04/19 0552   Discharge Needs Assessment   Readmission Within the Last 30 Days no previous admission in last 30 days   Concerns to be Addressed no discharge needs identified   Patient/Family Anticipates Transition to home   Patient/Family Anticipated Services at Transition none   Transportation Concerns car, none   Transportation Anticipated family or friend will provide   Anticipated Changes Related to Illness none   Equipment Needed After Discharge none   Disability   Equipment Currently Used at Home none       Problem: Anesthesia/Analgesia, Neuraxial (Obstetrics)  Goal: Signs and Symptoms of  Listed Potential Problems Will be Absent, Minimized or Managed (Anesthesia/Analgesia, Neuraxial)  Outcome: Ongoing (interventions implemented as appropriate)   19 05   Goal/Outcome Evaluation   Problems Assessed (Neuraxial Anesthesia/Analgesia, OB) all   Problems Present (Neuraxial Anesth OB) nausea and vomiting       Problem: Fall Risk,  (Adult,Obstetrics,Pediatric)  Goal: Identify Related Risk Factors and Signs and Symptoms  Outcome: Ongoing (interventions implemented as appropriate)   19 05   Fall Risk,  (Adult,Obstetrics,Pediatric)   Related Risk Factors (Fall Risk, ) medication side effects;medical devices;obesity;regional anesthesia   Signs and Symptoms (Fall Risk, ) presence of fall risk factors     Goal: Absence of Maternal Fall  Outcome: Ongoing (interventions implemented as appropriate)   19   Fall Risk,  (Adult,Obstetrics,Pediatric)   Absence of Maternal Fall achieves outcome     Goal: Absence of  Fall/Drop  Outcome: Ongoing (interventions implemented as appropriate)   19 05   Fall Risk,  (Adult,Obstetrics,Pediatric)   Absence of [place] Fall/Drop achieves outcome       Problem: Skin Injury Risk (Adult)  Goal: Identify Related Risk Factors and Signs and Symptoms  Outcome: Ongoing (interventions implemented as appropriate)   19 05   Skin Injury Risk (Adult)   Related Risk Factors (Skin Injury Risk) medical devices;medication;mobility impaired     Goal: Skin Health and Integrity  Outcome: Ongoing (interventions implemented as appropriate)   19   Skin Injury Risk (Adult)   Skin Health and Integrity achieves outcome

## 2019-06-04 NOTE — L&D DELIVERY NOTE
Procedure: Normal spontaneous vaginal delivery    Surgeon: Andriy Delarosa MD    Preop diagnosis: 24 year old  at 38-6/7 weeks gestational age in active labor    Postop diagnosis: Same    Indications: Patient presented to labor and delivery complaining of rupture membranes.  She was found to be grossly ruptured and 5 cm dilated.  She began having painful contractions spontaneously.  She progressed along a normal multiparous labor curve.  She was GBS positive, with allergies to penicillin.  Her GBS results were resistant to clindamycin; therefore, vancomycin was initiated.  She had received an epidural.  I was called to the room and the patient was completely dilated, completely effaced, +2 station and ready to begin pushing.  Fetal status was reassuring throughout labor.    Findings: Male infant, Apgar 8/9, Weight pending at the time of dictation; bilateral periurethral lacerations    Anesthesia: Epidural    EBL: 400 mL    Pathology: None    Complications: None    Procedure: I came to the room when the cervical exam was completely dilated, completely effaced, and +2 station. Under continuous external fetal heart rate monitoring, the patient was encouraged to push. With good maternal effort she delivered a viable male infant. The head presented in the occiput posterior presentation and restituted to right occiput transverse. There was no nuchal cord present. The left anterior fetal shoulder was then easily delivered with a gentle downward motion followed by the posterior fetal shoulder, followed by the remainder of the infant without difficulty. The infant was immediately vigorous. The oropharynx and nares were bulb-suction and the cord was clamped roughly 60 seconds following delivery. The cord was cut in between by the baby's father and the infant was placed on the maternal abdomen. Cord blood was then collected. The placenta then delivered spontaneously intact, and a three vessel cord was noted. Uterine  massage and Pitocin 30 units IV was given until the fundus was firm. The cervix, vagina, perineum, and rectum were carefully inspected for lacerations and bilateral periurethral lacerations were noted.  A Adorno catheter was inserted.  The lacerations were then repaired with 3-0 Vicryl Rapide.  Patient began having some brief episodes of bleeding again.  The uterus was bimanually massaged and expressed of clots.  A dose of Methergine 0.2 mg IM was given and 600 mcg of Cytotec was placed rectally.  Her bleeding improved at this point and the fundus was firm.  Counts for needles, sponges, laps and instruments were correct times two at the end of the delivery. There were no sponges left in the vagina. I was present and scrubbed for the entire delivery. There were no major complications. Mother and baby were bonding well at the end of the delivery.

## 2019-06-04 NOTE — ANESTHESIA PROCEDURE NOTES
Labor Epidural      Patient reassessed immediately prior to procedure    Patient location during procedure: OB  Start Time: 6/4/2019 12:05 AM  Indication:at surgeon's request  Performed By  Anesthesiologist: Sheyla Fisher MD  Preanesthetic Checklist  Completed: patient identified, site marked, pre-op evaluation, timeout performed, IV checked, risks and benefits discussed and monitors and equipment checked  Prep:  Pt Position:sitting  Sterile Tech:cap, gloves, mask and sterile barrier  Prep:chlorhexidine gluconate and isopropyl alcohol  Monitoring:blood pressure monitoring, continuous pulse oximetry and EKG  Epidural Block Procedure:  Approach:midline  Guidance:landmark technique and palpation technique  Location:L3-L4  Needle Type:Tuohy  Needle Gauge:17 G  Loss of Resistance Medium: air  Loss of Resistance: 10cm  Catheter at skin depth (cm): 18.  Paresthesia: none  Aspiration:negative  Test Dose:negative  Number of Attempts: 1  Post Assessment:  Dressing:occlusive dressing applied and secured with tape  Pt Tolerance:patient tolerated the procedure well with no apparent complications  Complications:no

## 2019-06-04 NOTE — ANESTHESIA PREPROCEDURE EVALUATION
Anesthesia Evaluation     Patient summary reviewed and Nursing notes reviewed   no history of anesthetic complications:  NPO Solid Status: N/A  NPO Liquid Status: N/A           Airway   Mallampati: II  TM distance: >3 FB  Neck ROM: full  No difficulty expected  Dental - normal exam     Pulmonary - negative pulmonary ROS and normal exam    breath sounds clear to auscultation  Cardiovascular - negative cardio ROS and normal exam    Rhythm: regular  Rate: normal        Neuro/Psych- negative ROS  GI/Hepatic/Renal/Endo    (+) obesity,       Musculoskeletal (-) negative ROS    Abdominal    Substance History - negative use     OB/GYN    (+) Pregnant,         Other - negative ROS                       Anesthesia Plan    ASA 2     epidural   (Disussed with pt via inyerpreter phone/screen)  Anesthetic plan, all risks, benefits, and alternatives have been provided, discussed and informed consent has been obtained with: patient and spouse/significant other.

## 2019-06-04 NOTE — H&P
The entirety of today's visit is performed with the aid of a medical  via the telephone.  Chief complaint: Leakage of fluid  History of present illness: Patient presents with ruptured membranes type event around 1045 PM on 6/3.  She has now started having some painful contractions about every 3 minutes.  No vaginal bleeding platelet.  Normal fetal movement.    Obstetric History       T1      L1     SAB0   TAB0   Ectopic0   Molar0   Multiple0   Live Births2       # Outcome Date GA Lbr Elijah/2nd Weight Sex Delivery Anes PTL Lv   3 Current            2  09/15/16 23w0d   M Vag-Spont  Y ND   1 Term 08/01/15 39w0d  3118 g (6 lb 14 oz) F Vag-Spont   FINESSE          History reviewed. No pertinent surgical history.    History reviewed. No pertinent past medical history.    Social History     Tobacco Use   • Smoking status: Never Smoker   • Smokeless tobacco: Never Used   Substance Use Topics   • Alcohol use: No   • Drug use: No       Family History   Problem Relation Age of Onset   • Breast cancer Neg Hx    • Ovarian cancer Neg Hx    • Uterine cancer Neg Hx    • Colon cancer Neg Hx    • Pulmonary embolism Neg Hx    • Deep vein thrombosis Neg Hx        No current facility-administered medications on file prior to encounter.      Current Outpatient Medications on File Prior to Encounter   Medication Sig   • aspirin 81 MG tablet Take 1 tablet by mouth Daily.   • Prenatal Vit-Fe Fumarate-FA (PRENATAL, CLASSIC, VITAMIN) 28-0.8 MG tablet tablet        Allergies   Allergen Reactions   • Latex Itching   • Penicillins Itching   • Amoxicillin Itching       ROS:  General: No fever or chills  Constitutional: No weight loss or gain, no hair loss  HENT: No headache, no hearing loss, no tinnitus  Eyes: normal vision, no eye pain  Lungs: No cough, no shortness of breath  Heart: No chest pain, no palpitations  Abdomen: No nausea, vomiting, constipation or diarrhea  : No dysuria, no hematuria  Skin: No  rashes  Lymph: No swelling  Neuro: No parathesia, no weakness  Psych: Normal though content, no hallucinations, no SI/HI    PE:  Vitals:    06/04/19 0000   Weight: 91.2 kg (201 lb)       General: No acute distress, awake and oriented ×3  Lungs: Clear to auscultation bilaterally  Cardiovascular: Regular rate and rhythm  Abdomen: Soft, nontender, nondistended, normoactive bowel sounds  Pelvic exam: Grossly ruptured membranes noted on exam with copious amounts of clear fluid per the nurse.  Cervical exam, per the nurse is 5 cm dilated 80% effaced.  She had been 2-3 cm dilated at her last visit in the office.  Extremities: No Tenderness, no Homans sign, 1+ lower extremity edema      NST: 130/reactive/moderate variability/intermittent mild variable decelerations  Tocometry: Contractions about every 2-4 minutes    Lab Results (last 24 hours)     Procedure Component Value Units Date/Time    CBC & Differential [[id number]] Collected:  06/03/19 2343    Specimen:  Blood Updated:  06/04/19 0001    Narrative:       The following orders were created for panel order CBC & Differential.  Procedure                               Abnormality         Status                     ---------                               -----------         ------                     CBC Auto Differential[[id number]]        Abnormal            Final result                 Please view results for these tests on the individual orders.    CBC Auto Differential [[id number]]  (Abnormal) Collected:  06/03/19 2343    Specimen:  Blood from Arm, Left Updated:  06/04/19 0001     WBC 11.68 10*3/mm3      RBC 4.63 10*6/mm3      Hemoglobin 14.0 g/dL      Hematocrit 41.8 %      MCV 90.3 fL      MCH 30.2 pg      MCHC 33.5 g/dL      RDW 13.7 %      RDW-SD 44.9 fl      MPV 11.0 fL      Platelets 237 10*3/mm3      Neutrophil % 69.2 %      Lymphocyte % 22.1 %      Monocyte % 6.8 %      Eosinophil % 0.5 %      Basophil % 0.4 %      Immature Grans % 1.0 %      Neutrophils,  Absolute 8.08 10*3/mm3      Lymphocytes, Absolute 2.58 10*3/mm3      Monocytes, Absolute 0.79 10*3/mm3      Eosinophils, Absolute 0.06 10*3/mm3      Basophils, Absolute 0.05 10*3/mm3      Immature Grans, Absolute 0.12 10*3/mm3      nRBC 0.0 /100 WBC         GBS positive, clindamycin resistant    Assessment:  1.  24-year-old  3 para 2 at 38-6/7 weeks gestational age in active spontaneous labor with rupture membranes  2.  GBS positive, resistant to clindamycin, penicillin allergic  3.  Fetal status reassuring    Plan:  1.  Patient is admitted for active labor with ruptured membranes.  She may receive her epidural at this time if she wishes.  Patient is GBS positive and penicillin allergic.  GBS cultures are resistant to clindamycin; therefore, vancomycin will be necessary.  We will administer dose of vancomycin now.  Patient appears to be making cervical change and entering the active phase of labor spontaneously.  We will observe for now, but if necessary could augment with Pitocin.    Factual information regarding the medical condition and the need for vaginal delivery, induction of labor and  section was discussed with the patient, who verbalized understanding. The therapeutic rationale, benefits, risks and potential complications were discussed, including the possibility of pain, bleeding, infection, loss of function, disfigurement, death or other unforeseen complications. Alternatives to the procedure were discussed (including potential risks, complications and benefits of each). No guarantee was expressed or implied as to the results of the procedure. Informed consent was given, as well as a request to proceed.

## 2019-06-04 NOTE — LACTATION NOTE
P3. Mom reports her other babies wouldn't latch but this baby has BF already. Baby not in room. Encouraged mom to call if needing assistance today.  Lactation Consult Note    Evaluation Completed: 2019 8:43 AM  Patient Name: Lauren Stahl  :  1995  MRN:  [id number]     REFERRAL  INFORMATION:                                         DELIVERY HISTORY:          Skin to skin initiation date/time: 2019  5:12 AM   Skin to skin end date/time: 2019            MATERNAL ASSESSMENT:                               INFANT ASSESSMENT:  Information for the patient's :  Lidia Stahl [[id number]]   No past medical history on file.                                                                                                                                MATERNAL INFANT FEEDING:                                                                       EQUIPMENT TYPE:                                 BREAST PUMPING:          LACTATION REFERRALS:

## 2019-06-05 LAB
BASOPHILS # BLD AUTO: 0.05 10*3/MM3 (ref 0–0.2)
BASOPHILS NFR BLD AUTO: 0.5 % (ref 0–1.5)
DEPRECATED RDW RBC AUTO: 45.3 FL (ref 37–54)
EOSINOPHIL # BLD AUTO: 0.08 10*3/MM3 (ref 0–0.4)
EOSINOPHIL NFR BLD AUTO: 0.8 % (ref 0.3–6.2)
ERYTHROCYTE [DISTWIDTH] IN BLOOD BY AUTOMATED COUNT: 13.7 % (ref 12.3–15.4)
HCT VFR BLD AUTO: 34.2 % (ref 34–46.6)
HGB BLD-MCNC: 11.3 G/DL (ref 12–15.9)
IMM GRANULOCYTES # BLD AUTO: 0.1 10*3/MM3 (ref 0–0.05)
IMM GRANULOCYTES NFR BLD AUTO: 1 % (ref 0–0.5)
LYMPHOCYTES # BLD AUTO: 2.85 10*3/MM3 (ref 0.7–3.1)
LYMPHOCYTES NFR BLD AUTO: 27.9 % (ref 19.6–45.3)
MCH RBC QN AUTO: 30.3 PG (ref 26.6–33)
MCHC RBC AUTO-ENTMCNC: 33 G/DL (ref 31.5–35.7)
MCV RBC AUTO: 91.7 FL (ref 79–97)
MONOCYTES # BLD AUTO: 0.61 10*3/MM3 (ref 0.1–0.9)
MONOCYTES NFR BLD AUTO: 6 % (ref 5–12)
NEUTROPHILS # BLD AUTO: 6.52 10*3/MM3 (ref 1.7–7)
NEUTROPHILS NFR BLD AUTO: 63.8 % (ref 42.7–76)
NRBC BLD AUTO-RTO: 0 /100 WBC (ref 0–0.2)
PLATELET # BLD AUTO: 189 10*3/MM3 (ref 140–450)
PMV BLD AUTO: 11.3 FL (ref 6–12)
RBC # BLD AUTO: 3.73 10*6/MM3 (ref 3.77–5.28)
WBC NRBC COR # BLD: 10.21 10*3/MM3 (ref 3.4–10.8)

## 2019-06-05 PROCEDURE — 85025 COMPLETE CBC W/AUTO DIFF WBC: CPT | Performed by: OBSTETRICS & GYNECOLOGY

## 2019-06-05 RX ADMIN — HYDROCODONE BITARTATE AND ACETAMINOPHEN 1 TABLET: 5; 325 TABLET ORAL at 14:57

## 2019-06-05 RX ADMIN — Medication 1 TABLET: at 09:25

## 2019-06-05 RX ADMIN — IBUPROFEN 800 MG: 800 TABLET, FILM COATED ORAL at 09:25

## 2019-06-05 RX ADMIN — DOCUSATE SODIUM 100 MG: 100 CAPSULE, LIQUID FILLED ORAL at 21:03

## 2019-06-05 RX ADMIN — HYDROCODONE BITARTATE AND ACETAMINOPHEN 1 TABLET: 5; 325 TABLET ORAL at 00:09

## 2019-06-05 RX ADMIN — HYDROCODONE BITARTATE AND ACETAMINOPHEN 1 TABLET: 5; 325 TABLET ORAL at 21:03

## 2019-06-05 RX ADMIN — DOCUSATE SODIUM 100 MG: 100 CAPSULE, LIQUID FILLED ORAL at 09:25

## 2019-06-05 RX ADMIN — HYDROCODONE BITARTATE AND ACETAMINOPHEN 1 TABLET: 5; 325 TABLET ORAL at 04:36

## 2019-06-05 RX ADMIN — IBUPROFEN 800 MG: 800 TABLET, FILM COATED ORAL at 00:09

## 2019-06-05 NOTE — PROGRESS NOTES
"Subjective:  Postpartum Day 1:     The patient feels well.  Pain is well controlled with current medications. The baby is well.  Urinary output is adequate. The patient is ambulating well. The patient is tolerating a normal diet. Flatus has been passed.      Objective:    Vital signs in last 24 hours:  Blood pressure 92/63, pulse 75, temperature 97.7 °F (36.5 °C), temperature source Oral, resp. rate 16, height 160 cm (63\"), weight 91.2 kg (201 lb), last menstrual period 08/31/2018, SpO2 98 %, currently breastfeeding.      General:    alert, appears stated age and cooperative   Uterine Fundus:   firm     Labs    WBC   Date Value Ref Range Status   06/05/2019 10.21 3.40 - 10.80 10*3/mm3 Final     RBC   Date Value Ref Range Status   06/05/2019 3.73 (L) 3.77 - 5.28 10*6/mm3 Final     Hemoglobin   Date Value Ref Range Status   06/05/2019 11.3 (L) 12.0 - 15.9 g/dL Final     Hematocrit   Date Value Ref Range Status   06/05/2019 34.2 34.0 - 46.6 % Final     MCV   Date Value Ref Range Status   06/05/2019 91.7 79.0 - 97.0 fL Final     MCH   Date Value Ref Range Status   06/05/2019 30.3 26.6 - 33.0 pg Final     MCHC   Date Value Ref Range Status   06/05/2019 33.0 31.5 - 35.7 g/dL Final     RDW   Date Value Ref Range Status   06/05/2019 13.7 12.3 - 15.4 % Final     RDW-SD   Date Value Ref Range Status   06/05/2019 45.3 37.0 - 54.0 fl Final     MPV   Date Value Ref Range Status   06/05/2019 11.3 6.0 - 12.0 fL Final     Platelets   Date Value Ref Range Status   06/05/2019 189 140 - 450 10*3/mm3 Final     Neutrophil %   Date Value Ref Range Status   06/05/2019 63.8 42.7 - 76.0 % Final     Lymphocyte %   Date Value Ref Range Status   06/05/2019 27.9 19.6 - 45.3 % Final     Monocyte %   Date Value Ref Range Status   06/05/2019 6.0 5.0 - 12.0 % Final     Eosinophil %   Date Value Ref Range Status   06/05/2019 0.8 0.3 - 6.2 % Final     Basophil %   Date Value Ref Range Status   06/05/2019 0.5 0.0 - 1.5 % Final     Immature Grans % "   Date Value Ref Range Status   06/05/2019 1.0 (H) 0.0 - 0.5 % Final     Neutrophils, Absolute   Date Value Ref Range Status   06/05/2019 6.52 1.70 - 7.00 10*3/mm3 Final     Lymphocytes, Absolute   Date Value Ref Range Status   06/05/2019 2.85 0.70 - 3.10 10*3/mm3 Final     Monocytes, Absolute   Date Value Ref Range Status   06/05/2019 0.61 0.10 - 0.90 10*3/mm3 Final     Eosinophils, Absolute   Date Value Ref Range Status   06/05/2019 0.08 0.00 - 0.40 10*3/mm3 Final     Basophils, Absolute   Date Value Ref Range Status   06/05/2019 0.05 0.00 - 0.20 10*3/mm3 Final     Immature Grans, Absolute   Date Value Ref Range Status   06/05/2019 0.10 (H) 0.00 - 0.05 10*3/mm3 Final     nRBC   Date Value Ref Range Status   06/05/2019 0.0 0.0 - 0.2 /100 WBC Final     Rh + / Male infant    Assessment/Plan:.     Postpartum Day #1  - Patient doing well.  Declines circumcision.  Likely discharge tomorrow.      Nadir Goldstein MD

## 2019-06-06 VITALS
HEIGHT: 63 IN | OXYGEN SATURATION: 98 % | BODY MASS INDEX: 35.61 KG/M2 | RESPIRATION RATE: 16 BRPM | SYSTOLIC BLOOD PRESSURE: 97 MMHG | WEIGHT: 201 LBS | HEART RATE: 80 BPM | TEMPERATURE: 98.6 F | DIASTOLIC BLOOD PRESSURE: 64 MMHG

## 2019-06-06 RX ORDER — HYDROCODONE BITARTRATE AND ACETAMINOPHEN 5; 325 MG/1; MG/1
1 TABLET ORAL EVERY 6 HOURS PRN
Qty: 20 TABLET | Refills: 0 | Status: SHIPPED | OUTPATIENT
Start: 2019-06-06

## 2019-06-06 RX ORDER — IBUPROFEN 800 MG/1
800 TABLET ORAL EVERY 8 HOURS SCHEDULED
Qty: 50 TABLET | Refills: 3 | Status: SHIPPED | OUTPATIENT
Start: 2019-06-06

## 2019-06-06 RX ADMIN — Medication 1 TABLET: at 08:42

## 2019-06-06 RX ADMIN — IBUPROFEN 800 MG: 800 TABLET, FILM COATED ORAL at 15:21

## 2019-06-06 RX ADMIN — HYDROCODONE BITARTATE AND ACETAMINOPHEN 1 TABLET: 5; 325 TABLET ORAL at 03:10

## 2019-06-06 RX ADMIN — DOCUSATE SODIUM 100 MG: 100 CAPSULE, LIQUID FILLED ORAL at 08:42

## 2019-06-06 RX ADMIN — IBUPROFEN 800 MG: 800 TABLET, FILM COATED ORAL at 06:21

## 2019-06-06 RX ADMIN — HYDROCODONE BITARTATE AND ACETAMINOPHEN 1 TABLET: 5; 325 TABLET ORAL at 15:22

## 2019-06-06 NOTE — PROGRESS NOTES
Postpartum Progress Note      Status post Vaginal Delivery: Doing well postoperatively.     1) postpartum care immediately following delivery : doing well, routine care. Discharge home       Rh status: A positive   Rubella: immune  Gender: Male (no Circ)     Subjective     Postpartum Day 2: Vaginal delivery    The patient feels well. The patient denies emotional concerns. Pain is well controlled with current medications. The baby is well. The patient is ambulating well. The patient is tolerating a normal diet.     Objective     Vital signs in last 24 hours:  Temp:  [98.6 °F (37 °C)-98.9 °F (37.2 °C)] 98.6 °F (37 °C)  Heart Rate:  [76-80] 80  Resp:  [16] 16  BP: ()/(64-65) 97/64      General:    alert, appears stated age and cooperative   Abdomen:  Soft, Non-tender    Lochia:  appropriate   Uterine Fundus:   firm   Ext    Edema 1+   DVT Evaluation:  No evidence of DVT seen on physical exam.     Lab Results   Component Value Date    WBC 10.21 06/05/2019    HGB 11.3 (L) 06/05/2019    HCT 34.2 06/05/2019    MCV 91.7 06/05/2019     06/05/2019       Isreal Glass MD  6/6/2019  11:31 AM

## 2019-06-06 NOTE — DISCHARGE SUMMARY
Date of Discharge:  2019    Discharge Diagnosis: postpartum care immediately after delivery     Presenting Problem/History of Present Illness  Pregnant [Z34.90]  Pregnant [Z34.90]  Term pregnancy [Z34.80]       Hospital Course  Patient is a 24 y.o. female  38w6d presented with labor.  For events surrounding her delivery please see delivery/op note.  Her postpartum course was uneventful and today PPD#2, she is ready for discharge.  She meets all milestones and criteria for discharge and instructions were reviewed and she voiced understanding.     Procedures Performed  Vaginal delivery        Consults:   Consults     No orders found from 2019 to 2019.          Pertinent Test Results: none    Condition on Discharge:  Stable     Discharge Disposition  Home or Self Care    Discharge Medications     Discharge Medications      New Medications      Instructions Start Date   HYDROcodone-acetaminophen 5-325 MG per tablet  Commonly known as:  NORCO   1 tablet, Oral, Every 6 Hours PRN      ibuprofen 800 MG tablet  Commonly known as:  ADVIL,MOTRIN   800 mg, Oral, Every 8 Hours Scheduled         Continue These Medications      Instructions Start Date   prenatal (CLASSIC) vitamin 28-0.8 MG tablet tablet   No dose, route, or frequency recorded.             Discharge Diet:   Diet Instructions     Advance Diet As Tolerated            Activity at Discharge:   Activity Instructions     Pelvic Rest            Follow-up Appointments  No future appointments.  Additional Instructions for the Follow-ups that You Need to Schedule     Discharge Follow-up with Specialty: Dr. Goldstein; 6 Weeks   As directed      Specialty:  Dr. Goldstein    Follow Up:  6 Weeks               Test Results Pending at Discharge       Isreal Glass MD  19  11:35 AM

## 2019-06-06 NOTE — NURSING NOTE
Discharge teaching sheet and boarder sheet given TITO New RN went over with pt all questions were answered now will discharge

## 2019-06-08 ENCOUNTER — HOSPITAL ENCOUNTER (EMERGENCY)
Facility: HOSPITAL | Age: 24
Discharge: HOME | End: 2019-06-08
Attending: EMERGENCY MEDICINE | Admitting: EMERGENCY MEDICINE

## 2019-06-08 ENCOUNTER — APPOINTMENT (OUTPATIENT)
Dept: CT IMAGING | Facility: HOSPITAL | Age: 24
End: 2019-06-08

## 2019-06-08 VITALS
HEIGHT: 59 IN | OXYGEN SATURATION: 97 % | SYSTOLIC BLOOD PRESSURE: 106 MMHG | RESPIRATION RATE: 18 BRPM | HEART RATE: 60 BPM | TEMPERATURE: 98.5 F | BODY MASS INDEX: 40.52 KG/M2 | DIASTOLIC BLOOD PRESSURE: 76 MMHG

## 2019-06-08 DIAGNOSIS — R10.31 RIGHT LOWER QUADRANT PAIN: Primary | ICD-10-CM

## 2019-06-08 LAB
ALBUMIN SERPL-MCNC: 3.5 G/DL (ref 3.5–5.2)
ALBUMIN/GLOB SERPL: 1.2 G/DL
ALP SERPL-CCNC: 86 U/L (ref 39–117)
ALT SERPL W P-5'-P-CCNC: 37 U/L (ref 1–33)
ANION GAP SERPL CALCULATED.3IONS-SCNC: 11.3 MMOL/L
AST SERPL-CCNC: 31 U/L (ref 1–32)
BASOPHILS # BLD AUTO: 0.05 10*3/MM3 (ref 0–0.2)
BASOPHILS NFR BLD AUTO: 0.6 % (ref 0–1.5)
BILIRUB SERPL-MCNC: 0.2 MG/DL (ref 0.2–1.2)
BUN BLD-MCNC: 12 MG/DL (ref 6–20)
BUN/CREAT SERPL: 19.4 (ref 7–25)
CALCIUM SPEC-SCNC: 9.1 MG/DL (ref 8.6–10.5)
CHLORIDE SERPL-SCNC: 105 MMOL/L (ref 98–107)
CO2 SERPL-SCNC: 25.7 MMOL/L (ref 22–29)
CREAT BLD-MCNC: 0.62 MG/DL (ref 0.57–1)
DEPRECATED RDW RBC AUTO: 48.1 FL (ref 37–54)
EOSINOPHIL # BLD AUTO: 0.19 10*3/MM3 (ref 0–0.4)
EOSINOPHIL NFR BLD AUTO: 2.1 % (ref 0.3–6.2)
ERYTHROCYTE [DISTWIDTH] IN BLOOD BY AUTOMATED COUNT: 14.2 % (ref 12.3–15.4)
GFR SERPL CREATININE-BSD FRML MDRD: 119 ML/MIN/1.73
GLOBULIN UR ELPH-MCNC: 3 GM/DL
GLUCOSE BLD-MCNC: 85 MG/DL (ref 65–99)
HCT VFR BLD AUTO: 36.5 % (ref 34–46.6)
HGB BLD-MCNC: 11.6 G/DL (ref 12–15.9)
IMM GRANULOCYTES # BLD AUTO: 0.08 10*3/MM3 (ref 0–0.05)
IMM GRANULOCYTES NFR BLD AUTO: 0.9 % (ref 0–0.5)
LYMPHOCYTES # BLD AUTO: 2.59 10*3/MM3 (ref 0.7–3.1)
LYMPHOCYTES NFR BLD AUTO: 28.7 % (ref 19.6–45.3)
MCH RBC QN AUTO: 29.9 PG (ref 26.6–33)
MCHC RBC AUTO-ENTMCNC: 31.8 G/DL (ref 31.5–35.7)
MCV RBC AUTO: 94.1 FL (ref 79–97)
MONOCYTES # BLD AUTO: 0.54 10*3/MM3 (ref 0.1–0.9)
MONOCYTES NFR BLD AUTO: 6 % (ref 5–12)
NEUTROPHILS # BLD AUTO: 5.58 10*3/MM3 (ref 1.7–7)
NEUTROPHILS NFR BLD AUTO: 61.7 % (ref 42.7–76)
NRBC BLD AUTO-RTO: 0 /100 WBC (ref 0–0.2)
PLATELET # BLD AUTO: 228 10*3/MM3 (ref 140–450)
PMV BLD AUTO: 10.7 FL (ref 6–12)
POTASSIUM BLD-SCNC: 3.6 MMOL/L (ref 3.5–5.2)
PROT SERPL-MCNC: 6.5 G/DL (ref 6–8.5)
RBC # BLD AUTO: 3.88 10*6/MM3 (ref 3.77–5.28)
SODIUM BLD-SCNC: 142 MMOL/L (ref 136–145)
WBC NRBC COR # BLD: 9.03 10*3/MM3 (ref 3.4–10.8)

## 2019-06-08 PROCEDURE — 25010000002 ONDANSETRON PER 1 MG: Performed by: PHYSICIAN ASSISTANT

## 2019-06-08 PROCEDURE — 80053 COMPREHEN METABOLIC PANEL: CPT | Performed by: EMERGENCY MEDICINE

## 2019-06-08 PROCEDURE — 99284 EMERGENCY DEPT VISIT MOD MDM: CPT

## 2019-06-08 PROCEDURE — 25010000002 MORPHINE PER 10 MG: Performed by: EMERGENCY MEDICINE

## 2019-06-08 PROCEDURE — 85025 COMPLETE CBC W/AUTO DIFF WBC: CPT | Performed by: EMERGENCY MEDICINE

## 2019-06-08 PROCEDURE — 96375 TX/PRO/DX INJ NEW DRUG ADDON: CPT

## 2019-06-08 PROCEDURE — 74176 CT ABD & PELVIS W/O CONTRAST: CPT

## 2019-06-08 PROCEDURE — 96374 THER/PROPH/DIAG INJ IV PUSH: CPT

## 2019-06-08 RX ORDER — MORPHINE SULFATE 2 MG/ML
4 INJECTION, SOLUTION INTRAMUSCULAR; INTRAVENOUS ONCE
Status: COMPLETED | OUTPATIENT
Start: 2019-06-08 | End: 2019-06-08

## 2019-06-08 RX ORDER — ONDANSETRON 2 MG/ML
4 INJECTION INTRAMUSCULAR; INTRAVENOUS ONCE
Status: COMPLETED | OUTPATIENT
Start: 2019-06-08 | End: 2019-06-08

## 2019-06-08 RX ADMIN — ONDANSETRON HYDROCHLORIDE 4 MG: 2 SOLUTION INTRAMUSCULAR; INTRAVENOUS at 19:34

## 2019-06-08 RX ADMIN — MORPHINE SULFATE 4 MG: 2 INJECTION, SOLUTION INTRAMUSCULAR; INTRAVENOUS at 19:37

## 2019-06-08 NOTE — ED NOTES
Pt gave birth on June 4th, vaginally. Baby is currently in NICU. Pt had onset of R flank pain today. Pt took pain medication at 1700, pain not improved.      Mansoor Adorno, RN  06/08/19 9303

## 2019-06-08 NOTE — ED PROVIDER NOTES
EMERGENCY DEPARTMENT ENCOUNTER    Room Number:  32/32  Date seen:  2019  Time seen: 7:14 PM  PCP: George Ivy MD    HPI:  Chief complaint:Abdominal pain  Context:Lauren Stahl is a 24 y.o. female who presents to the ED with c/o waxing and waning RLQ abd pain that began at 17:00 today. Pt states that the pain radiates into her RLE. She denies any known aggravating or alleviating factors. Pt is 4 days s/p vaginal delivery. The baby is admitted to the NICU here and she is boarding in the hospital currently. She reports mild vaginal bleeding, but denies any increase in this as well as any foul-smelling discharge, fever, nausea, or vomiting.    Onset: gradual  Location:RLQ  Radiation: into the RLE  Duration: 2 hours   Timing: waxing and waning  Character:pain  Aggravating Factors: none  Alleviating Factors: none  Severity: 8/10      ALLERGIES  Latex; Penicillins; and Amoxicillin    PAST MEDICAL HISTORY  Active Ambulatory Problems     Diagnosis Date Noted   • History of  delivery 10/17/2018   • High-risk pregnancy 10/17/2018   • Bicornuate uterus 10/19/2018   • Pregnancy 2019   • Normal labor 2019   • Normal delivery at term 2019   • Term pregnancy 2019     Resolved Ambulatory Problems     Diagnosis Date Noted   • Pregnant 2019   • Pregnant 2019     Past Medical History:   Diagnosis Date   • Bicornate uterus        PAST SURGICAL HISTORY  History reviewed. No pertinent surgical history.    FAMILY HISTORY  Family History   Problem Relation Age of Onset   • Breast cancer Neg Hx    • Ovarian cancer Neg Hx    • Uterine cancer Neg Hx    • Colon cancer Neg Hx    • Pulmonary embolism Neg Hx    • Deep vein thrombosis Neg Hx        SOCIAL HISTORY  Social History     Socioeconomic History   • Marital status: Unknown     Spouse name: Not on file   • Number of children: Not on file   • Years of education: Not on file   • Highest education level: Not on file   Tobacco Use   •  Smoking status: Never Smoker   • Smokeless tobacco: Never Used   Substance and Sexual Activity   • Alcohol use: No   • Drug use: No   • Sexual activity: Yes     Partners: Male     Birth control/protection: None       REVIEW OF SYSTEMS  Review of Systems   Constitutional: Negative for chills and fever.   HENT: Negative.    Eyes: Negative.    Respiratory: Negative for shortness of breath.    Cardiovascular: Negative for chest pain.   Gastrointestinal: Positive for abdominal pain (RLQ). Negative for nausea and vomiting.   Genitourinary: Positive for vaginal bleeding (mild). Negative for dysuria, frequency and hematuria.   Musculoskeletal: Negative.    Skin: Negative.    Neurological: Negative.    Psychiatric/Behavioral: Negative.        PHYSICAL EXAM  ED Triage Vitals [06/08/19 1903]   Temp Heart Rate Resp BP SpO2   98.5 °F (36.9 °C) 78 18 -- 100 %      Temp src Heart Rate Source Patient Position BP Location FiO2 (%)   -- -- -- -- --     Physical Exam   Constitutional: She is oriented to person, place, and time. She appears distressed (due to pain).   HENT:   Head: Normocephalic and atraumatic.   Right Ear: External ear normal.   Left Ear: External ear normal.   Nose: Nose normal.   Eyes: Conjunctivae are normal.   Neck: Normal range of motion.   Cardiovascular: Normal rate and regular rhythm.   Pulmonary/Chest: Effort normal and breath sounds normal.   Abdominal:   Normal bowel sounds  Soft  Nontender  Nondistended  No rebound, guarding, or rigidity  No appreciable organomegaly  No CVA tenderness   Musculoskeletal: Normal range of motion.   Neurological: She is alert and oriented to person, place, and time.   Skin: Skin is warm and dry.   Psychiatric: Affect normal.   Nursing note and vitals reviewed.      LAB RESULTS  Recent Results (from the past 24 hour(s))   Comprehensive Metabolic Panel    Collection Time: 06/08/19  7:28 PM   Result Value Ref Range    Glucose 85 65 - 99 mg/dL    BUN 12 6 - 20 mg/dL    Creatinine  0.62 0.57 - 1.00 mg/dL    Sodium 142 136 - 145 mmol/L    Potassium 3.6 3.5 - 5.2 mmol/L    Chloride 105 98 - 107 mmol/L    CO2 25.7 22.0 - 29.0 mmol/L    Calcium 9.1 8.6 - 10.5 mg/dL    Total Protein 6.5 6.0 - 8.5 g/dL    Albumin 3.50 3.50 - 5.20 g/dL    ALT (SGPT) 37 (H) 1 - 33 U/L    AST (SGOT) 31 1 - 32 U/L    Alkaline Phosphatase 86 39 - 117 U/L    Total Bilirubin 0.2 0.2 - 1.2 mg/dL    eGFR Non African Amer 119 >60 mL/min/1.73    Globulin 3.0 gm/dL    A/G Ratio 1.2 g/dL    BUN/Creatinine Ratio 19.4 7.0 - 25.0    Anion Gap 11.3 mmol/L   CBC Auto Differential    Collection Time: 06/08/19  7:28 PM   Result Value Ref Range    WBC 9.03 3.40 - 10.80 10*3/mm3    RBC 3.88 3.77 - 5.28 10*6/mm3    Hemoglobin 11.6 (L) 12.0 - 15.9 g/dL    Hematocrit 36.5 34.0 - 46.6 %    MCV 94.1 79.0 - 97.0 fL    MCH 29.9 26.6 - 33.0 pg    MCHC 31.8 31.5 - 35.7 g/dL    RDW 14.2 12.3 - 15.4 %    RDW-SD 48.1 37.0 - 54.0 fl    MPV 10.7 6.0 - 12.0 fL    Platelets 228 140 - 450 10*3/mm3    Neutrophil % 61.7 42.7 - 76.0 %    Lymphocyte % 28.7 19.6 - 45.3 %    Monocyte % 6.0 5.0 - 12.0 %    Eosinophil % 2.1 0.3 - 6.2 %    Basophil % 0.6 0.0 - 1.5 %    Immature Grans % 0.9 (H) 0.0 - 0.5 %    Neutrophils, Absolute 5.58 1.70 - 7.00 10*3/mm3    Lymphocytes, Absolute 2.59 0.70 - 3.10 10*3/mm3    Monocytes, Absolute 0.54 0.10 - 0.90 10*3/mm3    Eosinophils, Absolute 0.19 0.00 - 0.40 10*3/mm3    Basophils, Absolute 0.05 0.00 - 0.20 10*3/mm3    Immature Grans, Absolute 0.08 (H) 0.00 - 0.05 10*3/mm3    nRBC 0.0 0.0 - 0.2 /100 WBC       I ordered the above labs and reviewed the results    RADIOLOGY  CT Abdomen Pelvis Without Contrast   Final Result       1. The uterus is enlarged, in keeping with history of recent delivery.   There may be a very small amount of high density material within the   endometrial canal, in keeping with history of recent delivery. However,   no large volume clot, debris, or air is seen within the endometrial   canal.   2. The  appendix is visualized, and is within normal limits.   3. Nonspecific distention of the gallbladder.       Radiation dose reduction techniques were utilized, including automated   exposure control and exposure modulation based on body size.       This report was finalized on 6/8/2019 8:16 PM by Dr. Chikis East M.D.              I ordered the above noted radiological studies and reviewed the images on the PACS system.      MEDICATIONS GIVEN IN ER  Medications   morphine injection 4 mg (4 mg Intravenous Given 6/8/19 1937)   ondansetron (ZOFRAN) injection 4 mg (4 mg Intravenous Given 6/8/19 1934)     PROCEDURES  Procedures      PROGRESS AND CONSULTS    Progress Notes:         19:14  HR- 78 Temp- 98.5 °F (36.9 °C) O2 sat- 100% Informed pt of the plan for labs and CT abd/pelvis. Pt understands and agrees with the plan, all questions answered.    20:30 Reviewed pt's history and workup with Dr. Dickerson.  After a bedside evaluation; Dr Dickerson agrees with the plan of care    20:57 BP- 123/87 HR- 93 Temp- 98.5 °F (36.9 °C) O2 sat- 99%  Rechecked the patient who is in NAD and is resting comfortably. Pt states that the pain has completely resolved.   I re-examined her abdomen which remains soft, nontender, and nondistended. Informed pt that the CT shows enlargement of her gallbladder, but no acute inflammation. Informed pt that the labs show NAD. RN's have attempted x3 to obtain a cath UA, but d/t recent delivery and suture repair of perineal area, they have been unable to do so. Informed pt that as she is not having sx of UTI or a fever, so will d/c urinalysis. Clean catch will be contaminated due to recent delivery and usefulness would be limited.  plan for d/c with close follow up. She is already on rx for pain medication. Informed pt of the plan for d/c. Pt to take her pain medication previously prescribed. RTER warnings given for fever, chills, vomiting, increased pain, or urinary sx. Pt understands and agrees with  "the plan, all questions answered.    Disposition vitals:  /87   Pulse 93   Temp 98.5 °F (36.9 °C)   Resp 18   Ht 150 cm (59.06\")   LMP 08/31/2018 (Exact Date)   SpO2 99%   BMI 40.52 kg/m²       DIAGNOSIS  Final diagnoses:   Right lower quadrant pain     DISCHARGE    Patient discharged in stable condition.    Reviewed implications of results, diagnosis, meds, responsibility to follow up, warning signs and symptoms of possible worsening, potential complications and reasons to return to ER    Patient/Family voiced understanding of above instructions.    Discussed plan for discharge, as there is no emergent indication for admission. Patient referred to primary care provider for BP management due to today's BP. Pt/family is agreeable and understands need for follow up and repeat testing.  Pt is aware that discharge does not mean that nothing is wrong but it indicates no emergency is present that requires admission and they must continue care with follow-up as given below or physician of their choice.       FOLLOW UP   George Ivy MD  [address] [place] [address]  [phone]    In 2 days        RX     Medication List      No changes were made to your prescriptions during this visit.       Documentation assistance provided by kristyn Hall for Roxana Espinoza PA-C.  Information recorded by the [email or web address] was done at my direction and has been verified and validated by me.           Concepcion Hall  06/08/19 [id number]       Roxana Espinoza PA  06/09/19 0006    "

## 2019-06-09 NOTE — ED PROVIDER NOTES
MD ATTESTATION NOTE    The ANDRIA and I have discussed this patient's history, physical exam, and treatment plan.  I have reviewed the documentation and personally had a face to face interaction with the patient. I affirm the documentation and agree with the treatment and plan.  The attached note describes my personal findings.      Patient is 4 days status post vaginal delivery.  She presents with sudden onset of right lower quadrant pain that began around 5 PM.  There is minimal vaginal bleeding, no fever, and the pain is nonradiating    Patient has mild right lower quadrant tenderness to palpation, but otherwise unremarkable physical exam.  Labs and CT pending, and she was given morphine for pain     Balwinder Dickerson MD  06/08/19 2001

## 2019-06-09 NOTE — DISCHARGE INSTRUCTIONS
Normal diet as tolerated.   Recheck with your PCP in 1-2 days.  Return to the ER for severe pain, intractable vomiting, fever >100.4, new or worsening symptoms, any concerns.

## 2019-06-09 NOTE — ED NOTES
Attempted cath UA x 2 without success.  Maryan STAFFORD notified.  Will ask another RN to attempt.      Jennifer Sterling, RN  06/08/19 [id number]

## 2019-06-09 NOTE — ED NOTES
Pt reports RLQ pain that radiates down to R leg. Denies pain or burning with urination. No known fevers at home.  Pt is 4 days PP.      Sylvia Arceo RN  06/08/19 2018

## 2021-09-07 ENCOUNTER — HOME HEALTH ADMISSION (OUTPATIENT)
Dept: HOME HEALTH SERVICES | Facility: HOME HEALTHCARE | Age: 26
End: 2021-09-07

## 2021-09-07 ENCOUNTER — TRANSCRIBE ORDERS (OUTPATIENT)
Dept: HOME HEALTH SERVICES | Facility: HOME HEALTHCARE | Age: 26
End: 2021-09-07

## 2021-09-07 DIAGNOSIS — U07.1 CLINICAL DIAGNOSIS OF COVID-19: Primary | ICD-10-CM
